# Patient Record
Sex: MALE | Race: ASIAN | NOT HISPANIC OR LATINO | URBAN - METROPOLITAN AREA
[De-identification: names, ages, dates, MRNs, and addresses within clinical notes are randomized per-mention and may not be internally consistent; named-entity substitution may affect disease eponyms.]

---

## 2016-01-21 RX ORDER — ATORVASTATIN CALCIUM 80 MG/1
1 TABLET, FILM COATED ORAL
Qty: 0 | Refills: 0 | COMMUNITY
Start: 2016-01-21

## 2017-01-18 ENCOUNTER — OUTPATIENT (OUTPATIENT)
Dept: OUTPATIENT SERVICES | Facility: HOSPITAL | Age: 57
LOS: 1 days | End: 2017-01-18
Payer: MEDICAID

## 2017-01-18 ENCOUNTER — APPOINTMENT (OUTPATIENT)
Dept: MRI IMAGING | Facility: CLINIC | Age: 57
End: 2017-01-18

## 2017-01-18 DIAGNOSIS — R51 HEADACHE: ICD-10-CM

## 2017-01-18 PROCEDURE — 70551 MRI BRAIN STEM W/O DYE: CPT

## 2017-01-20 ENCOUNTER — APPOINTMENT (OUTPATIENT)
Dept: NEUROSURGERY | Facility: CLINIC | Age: 57
End: 2017-01-20

## 2017-01-20 VITALS
DIASTOLIC BLOOD PRESSURE: 78 MMHG | SYSTOLIC BLOOD PRESSURE: 122 MMHG | OXYGEN SATURATION: 98 % | HEART RATE: 79 BPM | RESPIRATION RATE: 16 BRPM | BODY MASS INDEX: 28.52 KG/M2 | HEIGHT: 62 IN | WEIGHT: 155 LBS | TEMPERATURE: 98.1 F

## 2017-03-03 ENCOUNTER — APPOINTMENT (OUTPATIENT)
Dept: CT IMAGING | Facility: IMAGING CENTER | Age: 57
End: 2017-03-03

## 2017-03-03 ENCOUNTER — OUTPATIENT (OUTPATIENT)
Dept: OUTPATIENT SERVICES | Facility: HOSPITAL | Age: 57
LOS: 1 days | End: 2017-03-03
Payer: MEDICAID

## 2017-03-03 DIAGNOSIS — I62.03 NONTRAUMATIC CHRONIC SUBDURAL HEMORRHAGE: ICD-10-CM

## 2017-03-03 PROCEDURE — 82565 ASSAY OF CREATININE: CPT

## 2017-03-03 PROCEDURE — 70496 CT ANGIOGRAPHY HEAD: CPT

## 2017-03-10 ENCOUNTER — APPOINTMENT (OUTPATIENT)
Dept: NEUROSURGERY | Facility: CLINIC | Age: 57
End: 2017-03-10

## 2017-03-16 ENCOUNTER — NON-APPOINTMENT (OUTPATIENT)
Age: 57
End: 2017-03-16

## 2017-03-16 ENCOUNTER — APPOINTMENT (OUTPATIENT)
Dept: CARDIOLOGY | Facility: CLINIC | Age: 57
End: 2017-03-16

## 2017-03-16 VITALS
WEIGHT: 160 LBS | BODY MASS INDEX: 29.44 KG/M2 | HEIGHT: 62 IN | SYSTOLIC BLOOD PRESSURE: 123 MMHG | HEART RATE: 81 BPM | DIASTOLIC BLOOD PRESSURE: 78 MMHG

## 2017-03-29 ENCOUNTER — APPOINTMENT (OUTPATIENT)
Dept: OTOLARYNGOLOGY | Facility: CLINIC | Age: 57
End: 2017-03-29

## 2017-03-29 VITALS
HEIGHT: 62 IN | DIASTOLIC BLOOD PRESSURE: 67 MMHG | WEIGHT: 160 LBS | HEART RATE: 70 BPM | SYSTOLIC BLOOD PRESSURE: 128 MMHG | BODY MASS INDEX: 29.44 KG/M2

## 2017-06-01 ENCOUNTER — OUTPATIENT (OUTPATIENT)
Dept: OUTPATIENT SERVICES | Facility: HOSPITAL | Age: 57
LOS: 1 days | End: 2017-06-01
Payer: MEDICAID

## 2017-06-13 ENCOUNTER — OUTPATIENT (OUTPATIENT)
Dept: OUTPATIENT SERVICES | Facility: HOSPITAL | Age: 57
LOS: 1 days | End: 2017-06-13
Payer: MEDICAID

## 2017-06-13 ENCOUNTER — APPOINTMENT (OUTPATIENT)
Dept: MRI IMAGING | Facility: IMAGING CENTER | Age: 57
End: 2017-06-13

## 2017-06-13 DIAGNOSIS — I62.03 NONTRAUMATIC CHRONIC SUBDURAL HEMORRHAGE: ICD-10-CM

## 2017-06-13 PROCEDURE — 70551 MRI BRAIN STEM W/O DYE: CPT

## 2017-06-16 ENCOUNTER — APPOINTMENT (OUTPATIENT)
Dept: NEUROSURGERY | Facility: CLINIC | Age: 57
End: 2017-06-16

## 2017-06-16 VITALS
HEIGHT: 62 IN | HEART RATE: 70 BPM | RESPIRATION RATE: 14 BRPM | WEIGHT: 160 LBS | DIASTOLIC BLOOD PRESSURE: 81 MMHG | OXYGEN SATURATION: 98 % | SYSTOLIC BLOOD PRESSURE: 118 MMHG | BODY MASS INDEX: 29.44 KG/M2

## 2017-06-16 DIAGNOSIS — I62.01 NONTRAUMATIC ACUTE SUBDURAL HEMORRHAGE: ICD-10-CM

## 2017-06-16 RX ORDER — ACETAMINOPHEN 325 MG/1
325 TABLET, FILM COATED ORAL
Refills: 0 | Status: ACTIVE | COMMUNITY

## 2017-06-19 LAB
BUN SERPL-MCNC: 19 MG/DL
CREAT SERPL-MCNC: 1.14 MG/DL

## 2017-06-27 ENCOUNTER — INPATIENT (INPATIENT)
Facility: HOSPITAL | Age: 57
LOS: 2 days | Discharge: HOME | End: 2017-06-30
Attending: INTERNAL MEDICINE | Admitting: INTERNAL MEDICINE
Payer: MEDICAID

## 2017-06-27 VITALS
TEMPERATURE: 98 F | SYSTOLIC BLOOD PRESSURE: 122 MMHG | HEART RATE: 99 BPM | RESPIRATION RATE: 16 BRPM | OXYGEN SATURATION: 100 % | DIASTOLIC BLOOD PRESSURE: 78 MMHG

## 2017-06-27 DIAGNOSIS — Z29.9 ENCOUNTER FOR PROPHYLACTIC MEASURES, UNSPECIFIED: ICD-10-CM

## 2017-06-27 DIAGNOSIS — I10 ESSENTIAL (PRIMARY) HYPERTENSION: ICD-10-CM

## 2017-06-27 DIAGNOSIS — I61.9 NONTRAUMATIC INTRACEREBRAL HEMORRHAGE, UNSPECIFIED: Chronic | ICD-10-CM

## 2017-06-27 DIAGNOSIS — R07.9 CHEST PAIN, UNSPECIFIED: ICD-10-CM

## 2017-06-27 DIAGNOSIS — I20.9 ANGINA PECTORIS, UNSPECIFIED: ICD-10-CM

## 2017-06-27 DIAGNOSIS — Z95.5 PRESENCE OF CORONARY ANGIOPLASTY IMPLANT AND GRAFT: Chronic | ICD-10-CM

## 2017-06-27 DIAGNOSIS — E78.00 PURE HYPERCHOLESTEROLEMIA, UNSPECIFIED: ICD-10-CM

## 2017-06-27 DIAGNOSIS — E11.9 TYPE 2 DIABETES MELLITUS WITHOUT COMPLICATIONS: ICD-10-CM

## 2017-06-27 LAB
ALBUMIN SERPL ELPH-MCNC: 4.5 G/DL — SIGNIFICANT CHANGE UP (ref 3.3–5)
ALP SERPL-CCNC: 92 U/L — SIGNIFICANT CHANGE UP (ref 40–120)
ALT FLD-CCNC: 29 U/L — SIGNIFICANT CHANGE UP (ref 4–41)
APTT BLD: 37.9 SEC — HIGH (ref 27.5–37.4)
AST SERPL-CCNC: 32 U/L — SIGNIFICANT CHANGE UP (ref 4–40)
BASOPHILS # BLD AUTO: 0.06 K/UL — SIGNIFICANT CHANGE UP (ref 0–0.2)
BASOPHILS NFR BLD AUTO: 1.1 % — SIGNIFICANT CHANGE UP (ref 0–2)
BILIRUB SERPL-MCNC: 0.6 MG/DL — SIGNIFICANT CHANGE UP (ref 0.2–1.2)
BUN SERPL-MCNC: 16 MG/DL — SIGNIFICANT CHANGE UP (ref 7–23)
CALCIUM SERPL-MCNC: 9.7 MG/DL — SIGNIFICANT CHANGE UP (ref 8.4–10.5)
CHLORIDE SERPL-SCNC: 99 MMOL/L — SIGNIFICANT CHANGE UP (ref 98–107)
CK MB BLD-MCNC: 1 — SIGNIFICANT CHANGE UP (ref 0–2.5)
CK MB BLD-MCNC: 2.68 NG/ML — SIGNIFICANT CHANGE UP (ref 1–6.6)
CK MB BLD-MCNC: 2.83 NG/ML — SIGNIFICANT CHANGE UP (ref 1–6.6)
CK SERPL-CCNC: 262 U/L — HIGH (ref 30–200)
CK SERPL-CCNC: 289 U/L — HIGH (ref 30–200)
CO2 SERPL-SCNC: 26 MMOL/L — SIGNIFICANT CHANGE UP (ref 22–31)
CREAT SERPL-MCNC: 1.03 MG/DL — SIGNIFICANT CHANGE UP (ref 0.5–1.3)
EOSINOPHIL # BLD AUTO: 0.34 K/UL — SIGNIFICANT CHANGE UP (ref 0–0.5)
EOSINOPHIL NFR BLD AUTO: 6 % — SIGNIFICANT CHANGE UP (ref 0–6)
GLUCOSE SERPL-MCNC: 137 MG/DL — HIGH (ref 70–99)
HBA1C BLD-MCNC: 7.7 % — HIGH (ref 4–5.6)
HCT VFR BLD CALC: 38.5 % — LOW (ref 39–50)
HGB BLD-MCNC: 12.4 G/DL — LOW (ref 13–17)
IMM GRANULOCYTES NFR BLD AUTO: 0.4 % — SIGNIFICANT CHANGE UP (ref 0–1.5)
INR BLD: 0.95 — SIGNIFICANT CHANGE UP (ref 0.88–1.17)
LYMPHOCYTES # BLD AUTO: 2.12 K/UL — SIGNIFICANT CHANGE UP (ref 1–3.3)
LYMPHOCYTES # BLD AUTO: 37.6 % — SIGNIFICANT CHANGE UP (ref 13–44)
MCHC RBC-ENTMCNC: 26.7 PG — LOW (ref 27–34)
MCHC RBC-ENTMCNC: 32.2 % — SIGNIFICANT CHANGE UP (ref 32–36)
MCV RBC AUTO: 82.8 FL — SIGNIFICANT CHANGE UP (ref 80–100)
MONOCYTES # BLD AUTO: 0.44 K/UL — SIGNIFICANT CHANGE UP (ref 0–0.9)
MONOCYTES NFR BLD AUTO: 7.8 % — SIGNIFICANT CHANGE UP (ref 2–14)
NEUTROPHILS # BLD AUTO: 2.66 K/UL — SIGNIFICANT CHANGE UP (ref 1.8–7.4)
NEUTROPHILS NFR BLD AUTO: 47.1 % — SIGNIFICANT CHANGE UP (ref 43–77)
PLATELET # BLD AUTO: 267 K/UL — SIGNIFICANT CHANGE UP (ref 150–400)
PMV BLD: 10.4 FL — SIGNIFICANT CHANGE UP (ref 7–13)
POTASSIUM SERPL-MCNC: 4 MMOL/L — SIGNIFICANT CHANGE UP (ref 3.5–5.3)
POTASSIUM SERPL-SCNC: 4 MMOL/L — SIGNIFICANT CHANGE UP (ref 3.5–5.3)
PROT SERPL-MCNC: 7.6 G/DL — SIGNIFICANT CHANGE UP (ref 6–8.3)
PROTHROM AB SERPL-ACNC: 10.6 SEC — SIGNIFICANT CHANGE UP (ref 9.8–13.1)
RBC # BLD: 4.65 M/UL — SIGNIFICANT CHANGE UP (ref 4.2–5.8)
RBC # FLD: 13.5 % — SIGNIFICANT CHANGE UP (ref 10.3–14.5)
SODIUM SERPL-SCNC: 140 MMOL/L — SIGNIFICANT CHANGE UP (ref 135–145)
TROPONIN T SERPL-MCNC: < 0.06 NG/ML — SIGNIFICANT CHANGE UP (ref 0–0.06)
TROPONIN T SERPL-MCNC: < 0.06 NG/ML — SIGNIFICANT CHANGE UP (ref 0–0.06)
WBC # BLD: 5.64 K/UL — SIGNIFICANT CHANGE UP (ref 3.8–10.5)
WBC # FLD AUTO: 5.64 K/UL — SIGNIFICANT CHANGE UP (ref 3.8–10.5)

## 2017-06-27 PROCEDURE — 71020: CPT | Mod: 26

## 2017-06-27 RX ORDER — ATORVASTATIN CALCIUM 80 MG/1
40 TABLET, FILM COATED ORAL AT BEDTIME
Qty: 0 | Refills: 0 | Status: DISCONTINUED | OUTPATIENT
Start: 2017-06-27 | End: 2017-06-30

## 2017-06-27 RX ORDER — DEXTROSE 50 % IN WATER 50 %
12.5 SYRINGE (ML) INTRAVENOUS ONCE
Qty: 0 | Refills: 0 | Status: DISCONTINUED | OUTPATIENT
Start: 2017-06-27 | End: 2017-06-30

## 2017-06-27 RX ORDER — DEXTROSE 50 % IN WATER 50 %
1 SYRINGE (ML) INTRAVENOUS ONCE
Qty: 0 | Refills: 0 | Status: DISCONTINUED | OUTPATIENT
Start: 2017-06-27 | End: 2017-06-30

## 2017-06-27 RX ORDER — INSULIN LISPRO 100/ML
VIAL (ML) SUBCUTANEOUS
Qty: 0 | Refills: 0 | Status: DISCONTINUED | OUTPATIENT
Start: 2017-06-27 | End: 2017-06-30

## 2017-06-27 RX ORDER — DEXTROSE 50 % IN WATER 50 %
25 SYRINGE (ML) INTRAVENOUS ONCE
Qty: 0 | Refills: 0 | Status: DISCONTINUED | OUTPATIENT
Start: 2017-06-27 | End: 2017-06-30

## 2017-06-27 RX ORDER — ENOXAPARIN SODIUM 100 MG/ML
40 INJECTION SUBCUTANEOUS EVERY 24 HOURS
Qty: 0 | Refills: 0 | Status: DISCONTINUED | OUTPATIENT
Start: 2017-06-27 | End: 2017-06-27

## 2017-06-27 RX ORDER — INSULIN LISPRO 100/ML
VIAL (ML) SUBCUTANEOUS AT BEDTIME
Qty: 0 | Refills: 0 | Status: DISCONTINUED | OUTPATIENT
Start: 2017-06-27 | End: 2017-06-30

## 2017-06-27 RX ORDER — ASPIRIN/CALCIUM CARB/MAGNESIUM 324 MG
81 TABLET ORAL ONCE
Qty: 0 | Refills: 0 | Status: COMPLETED | OUTPATIENT
Start: 2017-06-27 | End: 2017-06-27

## 2017-06-27 RX ORDER — GLUCAGON INJECTION, SOLUTION 0.5 MG/.1ML
1 INJECTION, SOLUTION SUBCUTANEOUS ONCE
Qty: 0 | Refills: 0 | Status: DISCONTINUED | OUTPATIENT
Start: 2017-06-27 | End: 2017-06-30

## 2017-06-27 RX ADMIN — ATORVASTATIN CALCIUM 40 MILLIGRAM(S): 80 TABLET, FILM COATED ORAL at 23:16

## 2017-06-27 RX ADMIN — Medication 81 MILLIGRAM(S): at 13:46

## 2017-06-27 NOTE — CONSULT NOTE ADULT - SUBJECTIVE AND OBJECTIVE BOX
CHIEF COMPLAINT: chest pain    HISTORY OF PRESENT ILLNESS:      Allergies    fish, meat and eggs (Vomiting)  No Known Drug Allergies    Intolerances    	    MEDICATIONS:                  PAST MEDICAL & SURGICAL HISTORY:  CAD (coronary artery disease)  Myocardial infarction: 2005  Hypercholesteremia  Hypertension  Diabetes mellitus: diet control  Acute intracerebral hemorrhage  History of coronary angiogram: stents - 4/2005 had 2 stents placed @ [facility] &amp; then 6/2005 had 3 stents placed at [facility]      FAMILY HISTORY:  No pertinent family history in first degree relatives      SOCIAL HISTORY:    [ ] Non-smoker  [ ] Smoker  [ ] Alcohol      REVIEW OF SYSTEMS:  See HPI. Otherwise, 10 point ROS done and otherwise negative.    PHYSICAL EXAM:  T(C): 36.7 (06-27-17 @ 12:37), Max: 36.7 (06-27-17 @ 12:37)  HR: 73 (06-27-17 @ 13:24) (73 - 99)  BP: 130/70 (06-27-17 @ 13:24) (122/78 - 130/70)  RR: 16 (06-27-17 @ 13:24) (16 - 16)  SpO2: 100% (06-27-17 @ 13:24) (100% - 100%)  Wt(kg): --  I&O's Summary      Appearance: Normal	  HEENT:   Normal oral mucosa, PERRL, EOMI	  Lymphatic: No lymphadenopathy  Cardiovascular: Normal S1 S2, No JVD, No murmurs, No edema  Respiratory: Lungs clear to auscultation	  Psychiatry: A & O x 3, Mood & affect appropriate  Gastrointestinal:  Soft, Non-tender, + BS	  Skin: No rashes, No ecchymoses, No cyanosis	  Neurologic: Non-focal  Extremities: Normal range of motion, No clubbing, cyanosis or edema  Vascular: Peripheral pulses palpable 2+ bilaterally    Cath 9/9/2013 oLAD 30%, pLAD 50%, mLAD 90% ISR, mcx 10% ISR, patent dcx stent, pOM3 40$, pRCA 20%, mRCA 20%, LVEF 65%      LABS:	 	              proBNP:   Lipid Profile:   HgA1c:   TSH:       CARDIAC MARKERS:            TELEMETRY: 	    ECG:  	  RADIOLOGY:  OTHER: 	    PREVIOUS DIAGNOSTIC TESTING:    [ ] Echocardiogram:  [ ]  Catheterization:  [ ] Stress Test:  	  	  ASSESSMENT/PLAN:

## 2017-06-27 NOTE — ED ADULT TRIAGE NOTE - CHIEF COMPLAINT QUOTE
Pt c/o chest pain radiating to left arm x 2 weeks progressively getting worse and more constant with SOB. hx 8 stents

## 2017-06-27 NOTE — ED PROVIDER NOTE - OBJECTIVE STATEMENT
Site/Location - left sided chest pain  Intensity / Severity - 7/10  Quality / Character - pressure/tightness  Onset / Duration - 2 days since yesterday morning continuous waxing waning  Radiation - to left shoulder  Context - has had similar pain over the past few weeks, last stress test Jan 2016, last stents 2013  Aggravating factors - exertion, but at baseline exercise tolerance  Alleviating factors - none  Associated Signs and Symptoms - no fever, no chills, +SOB, +diaphoresis, no vomiting, no LE edema, no cough Site/Location - left sided chest pain  Intensity / Severity - 7/10 no pain now  Quality / Character - pressure/tightness  Onset / Duration - 2 days since yesterday morning continuous waxing waning  Radiation - to left shoulder  Context - has had similar pain over the past few weeks, last stress test Jan 2016, last stents 2013; took 81mg asa today  Aggravating factors - exertion, but at baseline exercise tolerance  Alleviating factors - none  Associated Signs and Symptoms - no fever, no chills, +SOB, +diaphoresis, no vomiting, no LE edema, no cough

## 2017-06-27 NOTE — ED PROVIDER NOTE - PSH
Acute intracerebral hemorrhage    History of coronary angiogram  stents - 4/2005 had 2 stents placed @ [facility] & then 6/2005 had 3 stents placed at [facility]

## 2017-06-27 NOTE — ED PROVIDER NOTE - MEDICAL DECISION MAKING DETAILS
Chest pain concerning for ACS and CAD risk factors  1) serial EKGs/CXR/ASA/O2/cardiac monitor/LABS/nitro prn CP   2) reassess  3) Admit to telemetry

## 2017-06-27 NOTE — CONSULT NOTE ADULT - SUBJECTIVE AND OBJECTIVE BOX
Date of Admission: 6/27/17    CHIEF COMPLAINT: chest pain.    HISTORY OF PRESENT ILLNESS:    The patient is a 56 year old male with a history of hypertension and CAD with 8 stents and an echo on 2/16/17 showing LVEF 50%, presenting with chest pain since yesterday morning. The pain is constant, left sided, radiates to the left shoulder, jaw, and legs, and feels like a pressure with stabbing pain. He has had exertional angina for the past few weeks but the pain since yesterday morning has not subsided with rest.    LM:   --  LM: Normal. Short.  LAD:   --  Ostial LAD: There was a discrete 30 % stenosis at the site of a  prior stent.  --  Proximal LAD: There was a discrete 50 % stenosis at the origin of S1.  There was BEATRICE grade 3 flow through the vessel (brisk flow).  --  Mid LAD: There was a tubular 90 % stenosis at the site of a prior  stent, in the proximal third of the vessel segment. There was BEATRICE grade 3  flow through the vessel (brisk flow).  CX:   --  Proximal circumflex: Angiography showed minor luminal  irregularities with no flow limiting lesions.  --  Mid circumflex: There was a discrete 10 % stenosis at the site of a  prior stent.  --  Distal circumflex: There was a 0 % stenosis at the site of a prior  stent.  --  OM3: The vessel was medium sized. There was a tubular 40 % stenosis in  the proximal third of the vessel segment.  RCA:   --  RCA: Angiography showed minor luminal irregularities with no  flow limiting lesions.  --  Proximal RCA: There was a tubular 20 % stenosis.  --  Mid RCA: There was a discrete 20 % stenosis.        Allergies    fish, meat and eggs (Vomiting)  No Known Drug Allergies    Intolerances    	    MEDICATIONS:                  PAST MEDICAL & SURGICAL HISTORY:  CAD (coronary artery disease)  Myocardial infarction: 2005  Hypercholesteremia  Hypertension  Diabetes mellitus: diet control  Acute intracerebral hemorrhage  History of coronary angiogram: stents - 4/2005 had 2 stents placed @ [facility] &amp; then 6/2005 had 3 stents placed at [facility]      FAMILY HISTORY:  No pertinent family history in first degree relatives      SOCIAL HISTORY:    [ ] Non-smoker  [ ] Smoker  [ ] Alcohol      REVIEW OF SYSTEMS:  See HPI. Otherwise, 10 point ROS done and otherwise negative.    PHYSICAL EXAM:  T(C): 36.7 (06-27-17 @ 12:37), Max: 36.7 (06-27-17 @ 12:37)  HR: 73 (06-27-17 @ 13:24) (73 - 99)  BP: 130/70 (06-27-17 @ 13:24) (122/78 - 130/70)  RR: 16 (06-27-17 @ 13:24) (16 - 16)  SpO2: 100% (06-27-17 @ 13:24) (100% - 100%)  Wt(kg): --  I&O's Summary      Appearance: Normal	  HEENT:   Normal oral mucosa, PERRL, EOMI	  Lymphatic: No lymphadenopathy  Cardiovascular: Normal S1 S2, No JVD, No murmurs, No edema  Respiratory: Lungs clear to auscultation	  Psychiatry: A & O x 3, Mood & affect appropriate  Gastrointestinal:  Soft, Non-tender, + BS	  Skin: No rashes, No ecchymoses, No cyanosis	  Neurologic: Non-focal  Extremities: Normal range of motion, No clubbing, cyanosis or edema  Vascular: Peripheral pulses palpable 2+ bilaterally        LABS:	 	    CBC Full  -  ( 27 Jun 2017 12:08 )  WBC Count : 5.64 K/uL  Hemoglobin : 12.4 g/dL  Hematocrit : 38.5 %  Platelet Count - Automated : 267 K/uL  Mean Cell Volume : 82.8 fL  Mean Cell Hemoglobin : 26.7 pg  Mean Cell Hemoglobin Concentration : 32.2 %  Auto Neutrophil # : 2.66 K/uL  Auto Lymphocyte # : 2.12 K/uL  Auto Monocyte # : 0.44 K/uL  Auto Eosinophil # : 0.34 K/uL  Auto Basophil # : 0.06 K/uL  Auto Neutrophil % : 47.1 %  Auto Lymphocyte % : 37.6 %  Auto Monocyte % : 7.8 %  Auto Eosinophil % : 6.0 %  Auto Basophil % : 1.1 %    06-27    140  |  99  |  16  ----------------------------<  137<H>  4.0   |  26  |  1.03    Ca    9.7      27 Jun 2017 12:08    TPro  7.6  /  Alb  4.5  /  TBili  0.6  /  DBili  x   /  AST  32  /  ALT  29  /  AlkPhos  92  06-27      proBNP:   Lipid Profile:   HgA1c:   TSH:       CARDIAC MARKERS:      CKMB: 2.83 ng/mL (06-27 @ 12:08)        TELEMETRY: 	    ECG:  	  RADIOLOGY:  OTHER: 	    PREVIOUS DIAGNOSTIC TESTING:    [ ] Echocardiogram:  [ ]  Catheterization:  [ ] Stress Test:  	  	  ASSESSMENT/PLAN: Date of Admission: 6/27/17    CHIEF COMPLAINT: chest pain.    HISTORY OF PRESENT ILLNESS:    The patient is a 56 year old male with a history of hypertension and CAD with 8 stents and an echo on 2/16/17 showing LVEF 50%, presenting with chest pain since yesterday morning. The pain is constant, left sided, radiates to the left shoulder, jaw, and legs, and feels like a pressure with stabbing pain. He has had exertional angina for the past few weeks but the pain since yesterday morning has not subsided with rest. No nausea, vomiting, diaphoresis, SOB, or fevers. The patient's pain started to improve on his way to the hospital today. He has been taking only his normal daily medications which includes aspirin 81 mg daily.  9/9/13, oLAD 30%, pLAD 50%, mLAD 90% ISR, mCx 10% ISR, patent dCx stent, pOM3 40%, pRCA 20%, mRCA 20%, LVEF 65% SyborFyqdwkv5Aew CckpuTeolgld63Cfqts   Stent: 9/9/13, XPEDITION stents to pLAD 50% and mLAD 90% ISR UpjuoFjlcdvm66Wom LwdhtOuwqgwn91Gpaym GzvceNpycbuz78Vuj JopuuTzohtkl81Lzjac UeckdYwswimb84Omr WebqzXixlpkz22Bnouc DbjfbUagottx60Rkj HeknsYiqdoiy79Tovdl LpczlGjvydmd47Atr AskaxMbcnckm83Cllfq YanenWoceuln48Tqv XfnduYxbvcpz43Xkfqm LxcqvCtpprdy35Dsn ZcsfeRrmdmhu83Hrged JpnavPrjjqbs81Nsr OyjeoNmosqoz38Kcehh QcjldMgwsowg57Uav LtrjhJmfzgbp77Diobs JgyttDxuzjzt90Afj KqmlqNjkvjlt62Fhwad ZgcscImajdng79Coy TxfcuhiekaLiytjhrx039i7zp-vw86-774x-c744-h7c770n70951EtojKyz           LM:   --  LM: Normal. Short.  LAD:   --  Ostial LAD: There was a discrete 30 % stenosis at the site of a  prior stent.  --  Proximal LAD: There was a discrete 50 % stenosis at the origin of S1.  There was BEATRICE grade 3 flow through the vessel (brisk flow).  --  Mid LAD: There was a tubular 90 % stenosis at the site of a prior  stent, in the proximal third of the vessel segment. There was BEATRICE grade 3  flow through the vessel (brisk flow).  CX:   --  Proximal circumflex: Angiography showed minor luminal  irregularities with no flow limiting lesions.  --  Mid circumflex: There was a discrete 10 % stenosis at the site of a  prior stent.  --  Distal circumflex: There was a 0 % stenosis at the site of a prior  stent.  --  OM3: The vessel was medium sized. There was a tubular 40 % stenosis in  the proximal third of the vessel segment.  RCA:   --  RCA: Angiography showed minor luminal irregularities with no  flow limiting lesions.  --  Proximal RCA: There was a tubular 20 % stenosis.  --  Mid RCA: There was a discrete 20 % stenosis.        Allergies    fish, meat and eggs (Vomiting)  No Known Drug Allergies    Intolerances    	    MEDICATIONS:                  PAST MEDICAL & SURGICAL HISTORY:  CAD (coronary artery disease)  Myocardial infarction: 2005  Hypercholesteremia  Hypertension  Diabetes mellitus: diet control  Acute intracerebral hemorrhage  History of coronary angiogram: stents - 4/2005 had 2 stents placed @ [facility] &amp; then 6/2005 had 3 stents placed at [facility]      FAMILY HISTORY:  No pertinent family history in first degree relatives      SOCIAL HISTORY:    [ ] Non-smoker  [ ] Smoker  [ ] Alcohol      REVIEW OF SYSTEMS:  See HPI. Otherwise, 10 point ROS done and otherwise negative.    PHYSICAL EXAM:  T(C): 36.7 (06-27-17 @ 12:37), Max: 36.7 (06-27-17 @ 12:37)  HR: 73 (06-27-17 @ 13:24) (73 - 99)  BP: 130/70 (06-27-17 @ 13:24) (122/78 - 130/70)  RR: 16 (06-27-17 @ 13:24) (16 - 16)  SpO2: 100% (06-27-17 @ 13:24) (100% - 100%)  Wt(kg): --  I&O's Summary      Appearance: Normal	  HEENT:   Normal oral mucosa, PERRL, EOMI	  Lymphatic: No lymphadenopathy  Cardiovascular: Normal S1 S2, No JVD, No murmurs, No edema  Respiratory: Lungs clear to auscultation	  Psychiatry: A & O x 3, Mood & affect appropriate  Gastrointestinal:  Soft, Non-tender, + BS	  Skin: No rashes, No ecchymoses, No cyanosis	  Neurologic: Non-focal  Extremities: Normal range of motion, No clubbing, cyanosis or edema  Vascular: Peripheral pulses palpable 2+ bilaterally        LABS:	 	    CBC Full  -  ( 27 Jun 2017 12:08 )  WBC Count : 5.64 K/uL  Hemoglobin : 12.4 g/dL  Hematocrit : 38.5 %  Platelet Count - Automated : 267 K/uL  Mean Cell Volume : 82.8 fL  Mean Cell Hemoglobin : 26.7 pg  Mean Cell Hemoglobin Concentration : 32.2 %  Auto Neutrophil # : 2.66 K/uL  Auto Lymphocyte # : 2.12 K/uL  Auto Monocyte # : 0.44 K/uL  Auto Eosinophil # : 0.34 K/uL  Auto Basophil # : 0.06 K/uL  Auto Neutrophil % : 47.1 %  Auto Lymphocyte % : 37.6 %  Auto Monocyte % : 7.8 %  Auto Eosinophil % : 6.0 %  Auto Basophil % : 1.1 %    06-27    140  |  99  |  16  ----------------------------<  137<H>  4.0   |  26  |  1.03    Ca    9.7      27 Jun 2017 12:08    TPro  7.6  /  Alb  4.5  /  TBili  0.6  /  DBili  x   /  AST  32  /  ALT  29  /  AlkPhos  92  06-27      proBNP:   Lipid Profile:   HgA1c:   TSH:       CARDIAC MARKERS:      CKMB: 2.83 ng/mL (06-27 @ 12:08)        TELEMETRY: 	    ECG:  	  RADIOLOGY:  OTHER: 	    PREVIOUS DIAGNOSTIC TESTING:    [ ] Echocardiogram:  [ ]  Catheterization:  [ ] Stress Test:  	  	  ASSESSMENT/PLAN: Date of Admission: 6/27/17    CHIEF COMPLAINT: chest pain.    HISTORY OF PRESENT ILLNESS:    The patient is a 56 year old male with a history of CAD with stents in LAD x3, circumflex x2, OM3 x1, and RCA x2, an echo on 2/16/17 showing LVEF 50%, subdural hematoma S/P craniotomy in 2015, diabetes, hypertension, and hyperlipidemia, presenting with chest pain since yesterday morning. The pain is constant, left sided, radiates to the left shoulder, jaw, and legs, and feels like a pressure with stabbing pain. He has had exertional angina for the past few weeks but the pain since yesterday morning has not subsided with rest. No nausea, vomiting, diaphoresis, SOB, or fevers. The patient's pain started to improve on his way to the hospital today. He has been taking only his normal daily medications which includes aspirin 81 mg daily.     In 2015, the patient was on dual anticoagulant therapy for the stents (plavix and aspirin) and he developed a subdural hematoma, which was treated by craniotomy and has been followed up with many MRI’s. BpswuNresnie08Nxc DeaylUcmhyiu52Uqviz XizhvMbscvol76Rrt UxqidEfnxurc10Sebhv RvwhyKjununt80Xaw OjtueVzpbafb42Aipov UaffcDcpndxo96Ahq LylphXkpybkz72Czjdi QtpoyKrnvamj69Lkp LutzkTkwrdue10Mipxr HcqhrAaerdiq26Qod VdfzqUbszste78Nsxfs VzwwiFxkhoxo60Swq NjyqgXnteovz94Qltmu DxwteFlgmkgf01Zid LzlbjNpfcwxq81Jmbvu HhpwgLwstulo17Tjl IewcnZxdkljn55Rvlsn PvlsuNvchqzq22Qiv IqtxvZcfttkv87Bpqoh QwgdkFvjhcwi46Tyx JdtmrtnduyTulvlxsr905g4ua-kn69-297z-s387-o0z874f32924CnklHld  Since then, his only anticoagulant therapy has been aspirin 81 mg daily.    Cardiac cath:  9/9/13, oLAD 30%, pLAD 50%, mLAD 90% ISR, mCx 10% ISR, patent dCx stent, pOM3 40%, pRCA 20%, mRCA 20%, LVEF 65% GamtaZwhcvhd8Mzx HjjsbYaiynfn24Egnuw   Stent: 9/9/13, XPEDITION stents to pLAD 50% and mLAD 90% ISR PwbjmQkcfhad77Dmw TxutrIrkesxn20Szxmn EgaqjIflfuwi48Jpz FdkroWnmabel45Hlwma FscamHymutvn39Ntl LlfryIduowjq95Benub WrtmuOujwnbr30Dtd FufbrZuxtmwa18Vcdbx FpkxnGmqugch70Nmk GehwvEczonor33Wmkzw XwmznFoopmvu38Rms YdmpgEuhixoh58Hmhkb FuqnbZbtlrpv13Zkt JlgdzCdvwcaq07Jdbln OnpipZivengc16Qiv RpdelQfcusvz28Kwjwd TmxwhPrrwajj21Dvi RpsjtQbjedko06Bghuo TviwlAdruhga05Nrj AtcmgYfawbli17Rqtna TyhsiOhcwqdg09Xsz XqaxpxobpsGovvcqbd212c0sd-bf44-691t-u694-o5y924u54159RowhWxz         Allergies: No Known Drug Allergies    MEDICATIONS:    CurrentMeds_4_twCiteListControlStart Aspirin 81 MG TABS; TAKE 1 TABLET DAILY  Atorvastatin Calcium 40 MG Oral Tablet; TAKE 1 TABLET DAILY AS DIRECTED  Enalapril Maleate 10 MG Oral Tablet; TAKE 1 TABLET DAILY  Gemfibrozil 600 MG Oral Tablet; TAKE 1 TABLET DAILY  HydroCHLOROthiazide 25 MG Oral Tablet; TAKE 1 TABLET DAILY AS DIRECTED  MetFORMIN HCl - 850 MG Oral Tablet; Take 1 tablet daily  Metoprolol Succinate ER 25 MG Oral Tablet Extended Release 24 Hour; TAKE 1 TABLET  ONCE DAILY    PAST MEDICAL & SURGICAL HISTORY:  CAD (coronary artery disease)  Myocardial infarction: 2005  Hypercholesteremia  Hypertension  Diabetes mellitus: diet control  Subdural hematoma and craniotomy in 2015  History of coronary angiogram: stents - 4/2005 had 2 stents placed @ [facility] &amp; then 6/2005 had 3 stents placed at [facility]      FAMILY HISTORY:  Patient is unsure of pertinent family history in first degree relatives      SOCIAL HISTORY:    The patient is not working due to the head pain from the subdural hemorrhage and surgery in 2015.  Non-smoker, no illicit drugs, no alcohol          REVIEW OF SYSTEMS:  See HPI. Otherwise, 10 point ROS done and otherwise negative.    PHYSICAL EXAM:  T(C): 36.7 (06-27-17 @ 12:37), Max: 36.7 (06-27-17 @ 12:37)  HR: 73 (06-27-17 @ 13:24) (73 - 99)  BP: 130/70 (06-27-17 @ 13:24) (122/78 - 130/70)  RR: 16 (06-27-17 @ 13:24) (16 - 16)  SpO2: 100% (06-27-17 @ 13:24) (100% - 100%)      Appearance: No apparent distress	  HEENT:   Normal oral mucosa, PERRL, EOMI	  Lymphatic: No lymphadenopathy  Cardiovascular: Normal S1 S2, No JVD, No murmurs, No edema  Respiratory: Lungs clear to auscultation	  Psychiatry: A & O x 3, Mood & affect appropriate  Gastrointestinal:  Soft, Non-tender, + BS	  Skin: No rashes, No ecchymoses, No cyanosis	  Neurologic: Non-focal  Extremities: Normal range of motion, No clubbing, cyanosis or edema  Vascular: Peripheral pulses palpable 2+ bilaterally        LABS:	 	    CBC Full  -  ( 27 Jun 2017 12:08 )  WBC Count : 5.64 K/uL  Hemoglobin : 12.4 g/dL  Hematocrit : 38.5 %  Platelet Count - Automated : 267 K/uL  Mean Cell Volume : 82.8 fL  Mean Cell Hemoglobin : 26.7 pg  Mean Cell Hemoglobin Concentration : 32.2 %  Auto Neutrophil # : 2.66 K/uL  Auto Lymphocyte # : 2.12 K/uL  Auto Monocyte # : 0.44 K/uL  Auto Eosinophil # : 0.34 K/uL  Auto Basophil # : 0.06 K/uL  Auto Neutrophil % : 47.1 %  Auto Lymphocyte % : 37.6 %  Auto Monocyte % : 7.8 %  Auto Eosinophil % : 6.0 %  Auto Basophil % : 1.1 %    06-27    140  |  99  |  16  ----------------------------<  137<H>  4.0   |  26  |  1.03    Ca    9.7      27 Jun 2017 12:08    TPro  7.6  /  Alb  4.5  /  TBili  0.6  /  DBili  x   /  AST  32  /  ALT  29  /  AlkPhos  92  06-27    CARDIAC MARKERS:    Troponin <0.06 (06-27 @ 12:08)  CKMB: 2.83 ng/mL (06-27 @ 12:08)      ECG: normal sinus rhythm, no ST elevations or q waves  	  RADIOLOGY:    EXAM:  RAD CHEST PA LAT        PROCEDURE DATE:  Jun 27 2017         INTERPRETATION:  CLINICAL INDICATION: chest pain    EXAM:  Upright frontal and lateral chest from 6/27/2017 at 1353. Compared to   prior study from 1/19/2016.    IMPRESSION:  Clear lungs. No pleural effusions or pneumothorax.    Left coronary stent again noted. Otherwise cardiac and mediastinal   silhouettes within normal limits.    Trachea midline.    Unremarkable osseous structures.      PREVIOUS DIAGNOSTIC TESTING:    [ ] Echocardiogram:  Echo: 2/16/17, mild MR, normal LA, diastolic dysfunction, LVEF 50%; 1/14/16, normal LA, preserved LV function, LVEF 50% ScokpJvxdonl48Fkd YxupnNfhhwpo15Apmgc XezjgLqkwimb86Dht LwgrlCgwbcsm44Tfdur DwnbmChmwlph60Reg XrkedWpuzbtn03Wbvwd LvslrXaxqqgw99Hic HnyhzPxgmwfw10Kiawr FpzwkInrcdkq69Vnb WyhfhYqjkkmn4Emtgx  [ ]  Catheterization:  9/9/13, oLAD 30%, pLAD 50%, mLAD 90% ISR, mCx 10% ISR, patent dCx stent, pOM3 40%, pRCA 20%, mRCA 20%, LVEF 65% WdlozWlvddys9Wse AzdytWaspebh84Ikwbs   Stent: 9/9/13, XPEDITION stents to pLAD 50% and mLAD 90% ISR MsnfaJtsvych82Lll ZebmpIzwuinh96Rqqad IqzzsQjvgvdq96Kzl HerxgTugmglf63Abqaw YmvgcOrztcik52Cla TfwzsDmireyr68Yeksg HwuraWaozxtx12Wbx CxkihJnkvasl95Kihby KnjfxDndsfvk86Xfc HmwukGpellqr75Uipuy YddllVqtyyxx40Uto PfbywPkngidn28Dqrin PtvbuWefdhmh68Mpn FsignLxjwbbb54Gunnr BrkyfQtuswlq18Qub EaljaFmgtixw47Wapfj GdjglDaybjkj66Ggt XorqdDmvxrkw85Mspjh MuscxGeashan01Anj NfcsxVurwtft83Xfhfi GuhhaAcltrcb45Oie HfffzfdjdpGevbrykj369a6qg-eb59-353u-s710-[id number]    [ ] Stress Test:  	  Stress Test: 1/21/16 (regadenoson MIBI), no evidence of infarction or inducible ischemia, LVEF 67% MrbliZjbklpb26Wxi HajxlFjwarde57Fbgsm    	  ASSESSMENT/PLAN: 	    The patient is a 56 year old male with a history of hypertension, CAD with stents in LAD x 3, circumflex x2, OM3, and RCA x 2, an echo on 2/16/17 showing LVEF 50%, subdural hematoma S/P craniotomy in 2015, diabetes, hypertension, and hyperlipidemia, presenting with chest pain since yesterday morning.     # chest pain: The ekg and cardiac enzymes are negative for myocardial infarction. Considering the patient's angina and longstanding history of CAD, a stress test has been ordered for tomorrow to evaluate for ACS. Possible cardiac catherization to follow if myocardium grossly ischemic.  - continue aspirin 81 mg daily    # HTN  - continue Enalapril Maleate 10 MG Oral Tablet; TAKE 1 TABLET DAILY  - continue Metoprolol Succinate ER 25 MG Oral Tablet Extended Release 24 Hour; TAKE 1 TABLET  ONCE DAILY  - continue HydroCHLOROthiazide 25 MG Oral Tablet; TAKE 1 TABLET DAILY AS DIRECTED    # Hyperlipidemia  - continue Atorvastatin Calcium 40 MG Oral Tablet; TAKE 1 TABLET DAILY AS DIRECTED  -  continue Gemfibrozil 600 MG Oral Tablet; TAKE 1 TABLET DAILY    # diabetes  - MetFORMIN HCl - 850 MG Oral Tablet; Take 1 tablet daily    # HeapI diet Date of Admission: 6/27/17    CHIEF COMPLAINT: chest pain.    HISTORY OF PRESENT ILLNESS:    The patient is a 56 year old male with a history of CAD with stents in LAD x3, circumflex x2, OM3 x1, and RCA x2, an echo on 2/16/17 showing LVEF 50%, subdural hematoma S/P craniotomy in 2015, diabetes, hypertension, and hyperlipidemia, presenting with chest pain since yesterday morning. The pain is constant, left sided, radiates to the left shoulder, jaw, and legs, and feels like a pressure with stabbing pain. He has had exertional angina for the past few weeks but the pain since yesterday morning has not subsided with rest. No nausea, vomiting, diaphoresis, SOB, or fevers. The patient's pain started to improve on his way to the hospital today. He has been taking only his normal daily medications which includes aspirin 81 mg daily.     In 2015, the patient was on dual anticoagulant therapy for the stents (plavix and aspirin) and he developed a subdural hematoma, which was treated by craniotomy and has been followed up with many MRI’s. QzdftQsyttvg02Ojr BfwxbPxlxpyd40Wpaln AxjpnCzbcyqc65Hmd QqzjhTrtrfhp49Wjwgy HwbkkXywaygm65Kbt YhbswUrkyuqn53Bzgwa DmamvQoulppj45Gjc UtujuSmxehmu77Vlxqx XajbjWuqrbta89Qnb ZbbfmHjwxcjc66Gjusu SaollYkqkmjv51Vgv ZeltkPnezphn34Jhsbz IdhfwCmznryt80Emh IdfvrYfkknnk18Jsxnk UzkohIngtnvv56Gau ViekeFfyflet09Pmylt RkxnxLrxjcpp97Zrc UfxhePniresn44Myzii DjppcXcfzbul72Zpr BvynmFmocaia65Unhjm HhdraFudtidp03Spa NnvltxjzxcRdtyhaxb120c1xz-fr15-602q-f581-[id number]  Since then, his only anticoagulant therapy has been aspirin 81 mg daily.    Cardiac cath:  9/9/13, oLAD 30%, pLAD 50%, mLAD 90% ISR, mCx 10% ISR, patent dCx stent, pOM3 40%, pRCA 20%, mRCA 20%, LVEF 65% BkegyCladuxy2Nby XhkkhTujdbxb27Czorv   Stent: 9/9/13, XPEDITION stents to pLAD 50% and mLAD 90% ISR IrgebDzcpcsh06Npu TqoezLomfice97Rmpnq XkrslQkghbab97Icg GvlfaXxwflib90Zaujc PmjhoDlemhao67Eng QiumuWalqrbr35Wipix NwnjePtkfudn33Ubk LcvbgSycwmtc16Sxjye TvnusKtyotch46Qyi AbfewEmedljb59Jjfps GclujXvoqjwe46Mau FusgcQwfppjr63Dlejh HeyxdQagqbyv82Pow HzffhDtoqqnn98Gviud HeozxUhgzwcu64Gac WjbidEjocxjq82Qjgyj GxkvtQltkbjy04Vok MchtoZgpgyho56Rnyua ZdgdxLwttdjy76Kor FejosVxfqida95Xstqd CjxzuHfbmmlg33Doa MrwdkrkyujKrycquip238j0th-jl48-738e-q003-g2h826m75474HlhgQbt         Allergies: No Known Drug Allergies    MEDICATIONS:    CurrentMeds_4_twCiteListControlStart Aspirin 81 MG TABS; TAKE 1 TABLET DAILY  Atorvastatin Calcium 40 MG Oral Tablet; TAKE 1 TABLET DAILY AS DIRECTED  Enalapril Maleate 10 MG Oral Tablet; TAKE 1 TABLET DAILY  Gemfibrozil 600 MG Oral Tablet; TAKE 1 TABLET DAILY  HydroCHLOROthiazide 25 MG Oral Tablet; TAKE 1 TABLET DAILY AS DIRECTED  MetFORMIN HCl - 850 MG Oral Tablet; Take 1 tablet daily  Metoprolol Succinate ER 25 MG Oral Tablet Extended Release 24 Hour; TAKE 1 TABLET  ONCE DAILY    PAST MEDICAL & SURGICAL HISTORY:  CAD (coronary artery disease)  Myocardial infarction: 2005  Hypercholesteremia  Hypertension  Diabetes mellitus: diet control  Subdural hematoma and craniotomy in 2015  History of coronary angiogram: stents - 4/2005 had 2 stents placed @ [facility] &amp; then 6/2005 had 3 stents placed at [facility]      FAMILY HISTORY:  Patient is unsure of pertinent family history in first degree relatives      SOCIAL HISTORY:    The patient is not working due to the head pain from the subdural hemorrhage and surgery in 2015.  Non-smoker, no illicit drugs, no alcohol          REVIEW OF SYSTEMS:  See HPI. Otherwise, 10 point ROS done and otherwise negative.    PHYSICAL EXAM:  T(C): 36.7 (06-27-17 @ 12:37), Max: 36.7 (06-27-17 @ 12:37)  HR: 73 (06-27-17 @ 13:24) (73 - 99)  BP: 130/70 (06-27-17 @ 13:24) (122/78 - 130/70)  RR: 16 (06-27-17 @ 13:24) (16 - 16)  SpO2: 100% (06-27-17 @ 13:24) (100% - 100%)      Appearance: No apparent distress	  HEENT:   Normal oral mucosa, PERRL, EOMI	  Lymphatic: No lymphadenopathy  Cardiovascular: Normal S1 S2, No JVD, No murmurs, No edema  Respiratory: Lungs clear to auscultation	  Psychiatry: A & O x 3, Mood & affect appropriate  Gastrointestinal:  Soft, Non-tender, + BS	  Skin: No rashes, No ecchymoses, No cyanosis	  Neurologic: Non-focal  Extremities: Normal range of motion, No clubbing, cyanosis or edema  Vascular: Peripheral pulses palpable 2+ bilaterally        LABS:	 	    CBC Full  -  ( 27 Jun 2017 12:08 )  WBC Count : 5.64 K/uL  Hemoglobin : 12.4 g/dL  Hematocrit : 38.5 %  Platelet Count - Automated : 267 K/uL  Mean Cell Volume : 82.8 fL  Mean Cell Hemoglobin : 26.7 pg  Mean Cell Hemoglobin Concentration : 32.2 %  Auto Neutrophil # : 2.66 K/uL  Auto Lymphocyte # : 2.12 K/uL  Auto Monocyte # : 0.44 K/uL  Auto Eosinophil # : 0.34 K/uL  Auto Basophil # : 0.06 K/uL  Auto Neutrophil % : 47.1 %  Auto Lymphocyte % : 37.6 %  Auto Monocyte % : 7.8 %  Auto Eosinophil % : 6.0 %  Auto Basophil % : 1.1 %    06-27    140  |  99  |  16  ----------------------------<  137<H>  4.0   |  26  |  1.03    Ca    9.7      27 Jun 2017 12:08    TPro  7.6  /  Alb  4.5  /  TBili  0.6  /  DBili  x   /  AST  32  /  ALT  29  /  AlkPhos  92  06-27    CARDIAC MARKERS:    Troponin <0.06 (06-27 @ 12:08)  CKMB: 2.83 ng/mL (06-27 @ 12:08)      ECG: normal sinus rhythm, no ST elevations or q waves  	  RADIOLOGY:    EXAM:  RAD CHEST PA LAT        PROCEDURE DATE:  Jun 27 2017         INTERPRETATION:  CLINICAL INDICATION: chest pain    EXAM:  Upright frontal and lateral chest from 6/27/2017 at 1353. Compared to   prior study from 1/19/2016.    IMPRESSION:  Clear lungs. No pleural effusions or pneumothorax.    Left coronary stent again noted. Otherwise cardiac and mediastinal   silhouettes within normal limits.    Trachea midline.    Unremarkable osseous structures.      PREVIOUS DIAGNOSTIC TESTING:    [ ] Echocardiogram:  Echo: 2/16/17, mild MR, normal LA, diastolic dysfunction, LVEF 50%; 1/14/16, normal LA, preserved LV function, LVEF 50% VryotKllxdbf47Gao HomuaYuowbrp60Catqc RefycZbssvun32Arh NbozlSqahzlu56Sfubc EvcspRcmwcic37Mzc WhrreUiqlrxn97Mgkdn VeqjuGondvpa45Pny PxlfaKjocqym13Agxjs CcdjfKfnmgbg72Mmi AwdihOfspcmq5Sssmg  [ ]  Catheterization:  9/9/13, oLAD 30%, pLAD 50%, mLAD 90% ISR, mCx 10% ISR, patent dCx stent, pOM3 40%, pRCA 20%, mRCA 20%, LVEF 65% PriirXlefpzo6Vwo PdbexWxpzcip01Nmqwq   Stent: 9/9/13, XPEDITION stents to pLAD 50% and mLAD 90% ISR PanxoPzzsevo15Cwp ZqqluDfvdyik71Dfziq DjdigMasnzuq77Yva FlbhbDrujeyw58Qqezy QezeeAtneurf41Fml EyesmArnfant48Lipib PbunvQnekcca26Kvc AkxgmFilbcjs86Szoug RgiwkEymfojd83Xmr RslnjZqwsdcj24Ponqf JssiqLkfouzh35Nea ItgxpDqlnyev59Ejlua YmemyXdfxyze69Lhn EmbpjVuctrve84Acuvv CzyccXcyhjxi96Dub DvopnWmliysu24Ihkjh MxitjAmkuwpp12Wun CyixnDtbzmdh25Xtfye WffvxSpgebrm38Wwq FabvaEmxanff14Rtipp EazhfKbhhlhu77Gwy RlaxkvpmcwNxpfrkbj410u8xh-[id number]    [ ] Stress Test:  	  Stress Test: 1/21/16 (regadenoson MIBI), no evidence of infarction or inducible ischemia, LVEF 67% UxgrqDjdellh63Uef EikgdHoxrbcp74Reobm    	  ASSESSMENT/PLAN: 	    The patient is a 56 year old male with a history of hypertension, CAD with stents in LAD x 3, circumflex x2, OM3, and RCA x 2, an echo on 2/16/17 showing LVEF 50%, subdural hematoma S/P craniotomy in 2015, diabetes, hypertension, and hyperlipidemia, presenting with chest pain since yesterday morning.     # chest pain: The ekg and cardiac enzymes are negative for myocardial infarction. Considering the patient's angina and longstanding history of CAD, a stress test has been ordered for tomorrow to evaluate for ACS. Possible cardiac catherization to follow if myocardium grossly ischemic.  - continue aspirin 81 mg daily    # HTN  - continue Enalapril Maleate 10 MG Oral Tablet; TAKE 1 TABLET DAILY  - continue Metoprolol Succinate ER 25 MG Oral Tablet Extended Release 24 Hour; TAKE 1 TABLET  ONCE DAILY  - continue HydroCHLOROthiazide 25 MG Oral Tablet; TAKE 1 TABLET DAILY AS DIRECTED    # Hyperlipidemia  - continue Atorvastatin Calcium 40 MG Oral Tablet; TAKE 1 TABLET DAILY AS DIRECTED  -  continue Gemfibrozil 600 MG Oral Tablet; TAKE 1 TABLET DAILY    # diabetes  - MetFORMIN HCl - 850 MG Oral Tablet; Take 1 tablet daily    # PRACHII diet  #admit to cardiology primary under dr soliman

## 2017-06-27 NOTE — ED PROVIDER NOTE - PROGRESS NOTE DETAILS
Patrick Croft MD PGY3: Labs, imaging reviewed. Seen by cardiology, accepted to service. Telemetry PA signout @ [phone].

## 2017-06-27 NOTE — H&P ADULT - PROBLEM SELECTOR PLAN 1
tele monitor   cardiac enzymes x 2  Serial EKGs  DASH 1800 ADA diet  continue ASA, lipitor, enalapril, metoprolol and gemfibrozil.  FLP, HgA1c  consider NST  House Cards consult appreciated

## 2017-06-27 NOTE — H&P ADULT - PSH
Acute intracerebral hemorrhage    History of coronary angiogram  stents - 4/2005 had 2 stents placed @ [facility] & then 6/2005 had 3 stents placed at [facility]  Stented coronary artery  x8, last 3 in 2015

## 2017-06-27 NOTE — ED PROVIDER NOTE - PMH
CAD (coronary artery disease)    Diabetes mellitus  diet control  Hypercholesteremia    Hypertension    Myocardial infarction  2005

## 2017-06-27 NOTE — H&P ADULT - HISTORY OF PRESENT ILLNESS
55 yo male PMHx of HTN, DM, ICH, HLD, CAD and MI wit 8 cardiac stents p/w left sided chest pain intermittent for 2 weeks. Chest pain described as sharp, sticking pain, 7/10, radiating to the L shoulder and neck, non-pleuritic in nature. This morning pt developed cold sweat with chest pain. Chest pain is intermittent in nature, may occur at rest or on exertion, each episode lasting for 10-15 mins. No alleviating or exacerbating factors. Pt denies fever, chills, palpitations, nausea, vomiting, SOB or abdominal pain.

## 2017-06-27 NOTE — H&P ADULT - ASSESSMENT
57 yo male PMHx of HTN, DM, ICH, HLD, CAD and MI wit 8 cardiac stents p/w left sided chest pain intermittent for 2 weeks admitted to tele for angina pectoris, r/o ACS.

## 2017-06-27 NOTE — ED ADULT NURSE NOTE - OBJECTIVE STATEMENT
Pt presents to ED R#23 AOx4, in NAD, c/o L-sided CP waxing and waning x2 days, today pain has become constant and more intense, states somewhat less upon arrival to ED now at 4/10. Describes pain as "sharp", with L shoulder radiation. Also reports some SOB with CP. States pain is worse when lying flat. Denies palpitations/ dizziness/ weakness/ diaphoresis/ syncope/ abd pain/ N/V/ other acute complaints. EKG completed at triage. NSR on CM. VSS. IV inserted, BW collected and sent to lab. Seen by Dr Staley. Awaiting lab results and CXRAY. Will continue to monitor.

## 2017-06-27 NOTE — ED PROVIDER NOTE - PHYSICAL EXAMINATION
ATTENDING PHYSICAL EXAM DR. MCRAE ***GEN - NAD; well appearing; A+O x3 ***HEAD - NC/AT ***EYES/NOSE - PERRL, EOMI, mucous membranes moist, no discharge  ***PULMONARY - CTA b/l, symmetric breath sounds. ***CARDIAC -s1s2, RRR, no M,G,R  ***ABDOMEN - +BS, ND, NT, soft, no guarding, no rebound, no masses   ***BACK - no CVA tenderness, Normal  spine ***EXTREMITIES - symmetric pulses, 2+ dp, capillary refill < 2 seconds, no clubbing, no cyanosis, no edema ***SKIN - no rash or bruising   ***NEUROLOGIC - alert

## 2017-06-28 LAB
BUN SERPL-MCNC: 17 MG/DL — SIGNIFICANT CHANGE UP (ref 7–23)
CALCIUM SERPL-MCNC: 9.8 MG/DL — SIGNIFICANT CHANGE UP (ref 8.4–10.5)
CHLORIDE SERPL-SCNC: 99 MMOL/L — SIGNIFICANT CHANGE UP (ref 98–107)
CHOLEST SERPL-MCNC: 132 MG/DL — SIGNIFICANT CHANGE UP (ref 120–199)
CO2 SERPL-SCNC: 28 MMOL/L — SIGNIFICANT CHANGE UP (ref 22–31)
CREAT SERPL-MCNC: 0.9 MG/DL — SIGNIFICANT CHANGE UP (ref 0.5–1.3)
GLUCOSE SERPL-MCNC: 121 MG/DL — HIGH (ref 70–99)
HCT VFR BLD CALC: 37.2 % — LOW (ref 39–50)
HDLC SERPL-MCNC: 40 MG/DL — SIGNIFICANT CHANGE UP (ref 35–55)
HGB BLD-MCNC: 12.2 G/DL — LOW (ref 13–17)
LIPID PNL WITH DIRECT LDL SERPL: 91 MG/DL — SIGNIFICANT CHANGE UP
MCHC RBC-ENTMCNC: 27.2 PG — SIGNIFICANT CHANGE UP (ref 27–34)
MCHC RBC-ENTMCNC: 32.8 % — SIGNIFICANT CHANGE UP (ref 32–36)
MCV RBC AUTO: 82.9 FL — SIGNIFICANT CHANGE UP (ref 80–100)
PLATELET # BLD AUTO: 255 K/UL — SIGNIFICANT CHANGE UP (ref 150–400)
PMV BLD: 10.5 FL — SIGNIFICANT CHANGE UP (ref 7–13)
POTASSIUM SERPL-MCNC: 3.7 MMOL/L — SIGNIFICANT CHANGE UP (ref 3.5–5.3)
POTASSIUM SERPL-SCNC: 3.7 MMOL/L — SIGNIFICANT CHANGE UP (ref 3.5–5.3)
RBC # BLD: 4.49 M/UL — SIGNIFICANT CHANGE UP (ref 4.2–5.8)
RBC # FLD: 13.4 % — SIGNIFICANT CHANGE UP (ref 10.3–14.5)
SODIUM SERPL-SCNC: 141 MMOL/L — SIGNIFICANT CHANGE UP (ref 135–145)
TRIGL SERPL-MCNC: 98 MG/DL — SIGNIFICANT CHANGE UP (ref 10–149)
WBC # BLD: 6.02 K/UL — SIGNIFICANT CHANGE UP (ref 3.8–10.5)
WBC # FLD AUTO: 6.02 K/UL — SIGNIFICANT CHANGE UP (ref 3.8–10.5)

## 2017-06-28 RX ORDER — METOPROLOL TARTRATE 50 MG
25 TABLET ORAL DAILY
Qty: 0 | Refills: 0 | Status: DISCONTINUED | OUTPATIENT
Start: 2017-06-28 | End: 2017-06-30

## 2017-06-28 RX ORDER — GEMFIBROZIL 600 MG
600 TABLET ORAL DAILY
Qty: 0 | Refills: 0 | Status: DISCONTINUED | OUTPATIENT
Start: 2017-06-28 | End: 2017-06-30

## 2017-06-28 RX ORDER — HYDROCHLOROTHIAZIDE 25 MG
25 TABLET ORAL DAILY
Qty: 0 | Refills: 0 | Status: DISCONTINUED | OUTPATIENT
Start: 2017-06-28 | End: 2017-06-30

## 2017-06-28 RX ORDER — ASPIRIN/CALCIUM CARB/MAGNESIUM 324 MG
81 TABLET ORAL DAILY
Qty: 0 | Refills: 0 | Status: DISCONTINUED | OUTPATIENT
Start: 2017-06-28 | End: 2017-06-30

## 2017-06-28 RX ADMIN — Medication 10 MILLIGRAM(S): at 17:38

## 2017-06-28 RX ADMIN — Medication 600 MILLIGRAM(S): at 22:15

## 2017-06-28 RX ADMIN — Medication: at 13:12

## 2017-06-28 RX ADMIN — Medication 25 MILLIGRAM(S): at 17:38

## 2017-06-28 RX ADMIN — ATORVASTATIN CALCIUM 40 MILLIGRAM(S): 80 TABLET, FILM COATED ORAL at 22:15

## 2017-06-28 RX ADMIN — Medication 81 MILLIGRAM(S): at 17:38

## 2017-06-28 NOTE — PROGRESS NOTE ADULT - SUBJECTIVE AND OBJECTIVE BOX
Date of Admission: 6/27/17    24 hour events: Patient reports one 2-3 minute episode of chest pain last night at 11:00 p.m. The pain was stabbing, non-radiating, and increased when he palpated his chest. No chest pain to palpation this morning. No SOB, diaphoresis, nausea or vomiting.        MEDICATIONS:    Aspirin 81 MG TABS; TAKE 1 TABLET DAILY  Atorvastatin Calcium 40 MG Oral Tablet; TAKE 1 TABLET DAILY AS DIRECTED  Enalapril Maleate 10 MG Oral Tablet; TAKE 1 TABLET DAILY  Gemfibrozil 600 MG Oral Tablet; TAKE 1 TABLET DAILY  HydroCHLOROthiazide 25 MG Oral Tablet; TAKE 1 TABLET DAILY AS DIRECTED  MetFORMIN HCl - 850 MG Oral Tablet; Take 1 tablet daily  Metoprolol Succinate ER 25 MG Oral Tablet Extended Release 24 Hour; TAKE 1 TABLET  ONCE DAILY      REVIEW OF SYSTEMS:  Complete 10point ROS negative.    PHYSICAL EXAM:  T(C): 36.5 (06-28-17 @ 05:08), Max: 36.7 (06-27-17 @ 12:37)  HR: 55 (06-28-17 @ 05:08) (55 - 99)  BP: 128/86 (06-28-17 @ 05:08) (104/70 - 130/70)  RR: 17 (06-28-17 @ 05:08) (16 - 17)  SpO2: 100% (06-28-17 @ 05:08) (100% - 100%)  Wt(kg): --  I&O's Summary      Appearance: Normal	  HEENT:   Normal oral mucosa, PERRL, EOMI	  Lymphatic: No lymphadenopathy  Cardiovascular: Normal S1 S2, No JVD, No murmurs, No edema  Respiratory: Lungs clear to auscultation	  Psychiatry: A & O x 3, Mood & affect appropriate  Gastrointestinal:  Soft, Non-tender, + BS	  Skin: No rashes, No ecchymoses, No cyanosis	  Neurologic: Non-focal  Extremities: Normal range of motion, No clubbing, cyanosis or edema  Vascular: Peripheral pulses palpable 2+ bilaterally        LABS:	 	    CBC Full  -  ( 28 Jun 2017 06:30 )  WBC Count : 6.02 K/uL  Hemoglobin : 12.2 g/dL  Hematocrit : 37.2 %  Platelet Count - Automated : 255 K/uL  Mean Cell Volume : 82.9 fL  Mean Cell Hemoglobin : 27.2 pg  Mean Cell Hemoglobin Concentration : 32.8 %  Auto Neutrophil # : x  Auto Lymphocyte # : x  Auto Monocyte # : x  Auto Eosinophil # : x  Auto Basophil # : x  Auto Neutrophil % : x  Auto Lymphocyte % : x  Auto Monocyte % : x  Auto Eosinophil % : x  Auto Basophil % : x    06-28    141  |  99  |  17  ----------------------------<  121<H>  3.7   |  28  |  0.90  06-27    140  |  99  |  16  ----------------------------<  137<H>  4.0   |  26  |  1.03    Ca    9.8      28 Jun 2017 06:30  Ca    9.7      27 Jun 2017 12:08    TPro  7.6  /  Alb  4.5  /  TBili  0.6  /  DBili  x   /  AST  32  /  ALT  29  /  AlkPhos  92  06-27    HgA1c: 7.7 (6-27-17 19:20)        CARDIAC MARKERS:  <0.06 6-27-17 19:20  <0.06 6-27-17 12:08        ECG: normal sinus rhythm, no ST elevations or q waves  	  RADIOLOGY:    EXAM:  RAD CHEST PA LAT        PROCEDURE DATE:  Jun 27 2017     INTERPRETATION:  CLINICAL INDICATION: chest pain    EXAM:  Upright frontal and lateral chest from 6/27/2017 at 1353. Compared to   prior study from 1/19/2016.    IMPRESSION:  Clear lungs. No pleural effusions or pneumothorax.    Left coronary stent again noted. Otherwise cardiac and mediastinal   silhouettes within normal limits.    Trachea midline.    Unremarkable osseous structures.      PREVIOUS DIAGNOSTIC TESTING:    [ ] Echocardiogram:  Echo: 2/16/17, mild MR, normal LA, diastolic dysfunction, LVEF 50%; 1/14/16, normal LA, preserved LV function, LVEF 50% GdcbqAgzhreb44Pkd RygdnHouwmdo03Vxypa IwfqeTnbztzc52Mfs UyewuKphdjln40Eegyz TnjgmQskdxbl81Oua JaxskUqqqkks29Opajc TeyhsMlulgms74Yko RxadaDijkjwh92Bqcfh VzhgyNyckqvy59Qbg RxiypGmgzsqe5Rgffx  [ ]  Catheterization:  9/9/13, oLAD 30%, pLAD 50%, mLAD 90% ISR, mCx 10% ISR, patent dCx stent, pOM3 40%, pRCA 20%, mRCA 20%, LVEF 65% QejwnJhhvbft5Zjd OllqeUnoqfdl39Tfvos   Stent: 9/9/13, XPEDITION stents to pLAD 50% and mLAD 90% ISR QejbaJwfpibi36Ura NjoewVpmdybs81Epppe HrsitCzjmlkt40Fjz IccefSvoqqsv20Erais JzdcaZqbsxyv62Pks GbeapLsccbfu45Xvpag ObbssOzbbfco48Snd KrpcdHdovnkz53Qgtgu DpcohHfjkusu90Rao HydvuMpdsfjl85Wtawo FptfsZwjdntt38Gqc IvxcqFhreulm46Cydci OmumiVpxwsvc93Suc ZjcgoIlcenbl12Nptpv WodtsNgjjsbh69Tjp LvnpfBzkjyhu09Zkwvy SxbarVchfejr22Hzb EfjocIggorru67Uemqm ObjpeGxyfdek47Lei NldrjBtuqvxc86Gbtkx NqrhzTmatmqt94Fnr GfsseiznhnMahymdns967o1nh-gk95-286r-[id number]    [ ] Stress Test:  	  Stress Test: 1/21/16 (regadenoson MIBI), no evidence of infarction or inducible ischemia, LVEF 67% SzexkQwjfmcg08Qeq CrhqkCnhczpl15Ecgab    ASSESSMENT/PLAN: 	    ASSESSMENT/PLAN: 	    The patient is a 56 year old male with a history of hypertension, CAD with stents in LAD x 3, circumflex x2, OM3, and RCA x 2, an echo on 2/16/17 showing LVEF 50%, subdural hematoma S/P craniotomy in 2015, diabetes, hypertension, and hyperlipidemia, presenting with chest pain since yesterday morning.     # chest pain: The ekg and cardiac enzymes are negative for myocardial infarction. Considering the patient's angina and longstanding history of CAD, a stress test has been scheduled for today to evaluate for ACS. Possible cardiac catheterization to follow if myocardium grossly ischemic. Possible costo  - continue aspirin 81 mg daily    # HTN  - continue Enalapril Maleate 10 MG Oral Tablet; TAKE 1 TABLET DAILY  - continue Metoprolol Succinate ER 25 MG Oral Tablet Extended Release 24 Hour; TAKE 1 TABLET  ONCE DAILY  - continue HydroCHLOROthiazide 25 MG Oral Tablet; TAKE 1 TABLET DAILY AS DIRECTED    # Hyperlipidemia  - continue Atorvastatin Calcium 40 MG Oral Tablet; TAKE 1 TABLET DAILY AS DIRECTED  -  continue Gemfibrozil 600 MG Oral Tablet; TAKE 1 TABLET DAILY    # diabetes  - MetFORMIN HCl - 850 MG Oral Tablet; Take 1 tablet daily    # [place] [id number] Date of Admission: 6/27/17    24 hour events: Patient reports one 2-3 minute episode of chest pain last night at 11:00 p.m. The pain was stabbing, non-radiating, and increased when he palpated his chest. No chest pain to palpation this morning. No SOB, diaphoresis, nausea or vomiting.        MEDICATIONS:    Aspirin 81 MG TABS; TAKE 1 TABLET DAILY  Atorvastatin Calcium 40 MG Oral Tablet; TAKE 1 TABLET DAILY AS DIRECTED  Enalapril Maleate 10 MG Oral Tablet; TAKE 1 TABLET DAILY  Gemfibrozil 600 MG Oral Tablet; TAKE 1 TABLET DAILY  HydroCHLOROthiazide 25 MG Oral Tablet; TAKE 1 TABLET DAILY AS DIRECTED  MetFORMIN HCl - 850 MG Oral Tablet; Take 1 tablet daily  Metoprolol Succinate ER 25 MG Oral Tablet Extended Release 24 Hour; TAKE 1 TABLET  ONCE DAILY      REVIEW OF SYSTEMS:  Complete 10point ROS negative.    PHYSICAL EXAM:  T(C): 36.5 (06-28-17 @ 05:08), Max: 36.7 (06-27-17 @ 12:37)  HR: 55 (06-28-17 @ 05:08) (55 - 99)  BP: 128/86 (06-28-17 @ 05:08) (104/70 - 130/70)  RR: 17 (06-28-17 @ 05:08) (16 - 17)  SpO2: 100% (06-28-17 @ 05:08) (100% - 100%)  Wt(kg): --  I&O's Summary      Appearance: Normal	  HEENT:   Normal oral mucosa, PERRL, EOMI	  Lymphatic: No lymphadenopathy  Cardiovascular: Normal S1 S2, No JVD, No murmurs, No edema  Respiratory: Lungs clear to auscultation	  Psychiatry: A & O x 3, Mood & affect appropriate  Gastrointestinal:  Soft, Non-tender, + BS	  Skin: No rashes, No ecchymoses, No cyanosis	  Neurologic: Non-focal  Extremities: Normal range of motion, No clubbing, cyanosis or edema  Vascular: Peripheral pulses palpable 2+ bilaterally        LABS:	 	    CBC Full  -  ( 28 Jun 2017 06:30 )  WBC Count : 6.02 K/uL  Hemoglobin : 12.2 g/dL  Hematocrit : 37.2 %  Platelet Count - Automated : 255 K/uL  Mean Cell Volume : 82.9 fL  Mean Cell Hemoglobin : 27.2 pg  Mean Cell Hemoglobin Concentration : 32.8 %  Auto Neutrophil # : x  Auto Lymphocyte # : x  Auto Monocyte # : x  Auto Eosinophil # : x  Auto Basophil # : x  Auto Neutrophil % : x  Auto Lymphocyte % : x  Auto Monocyte % : x  Auto Eosinophil % : x  Auto Basophil % : x    06-28    141  |  99  |  17  ----------------------------<  121<H>  3.7   |  28  |  0.90  06-27    140  |  99  |  16  ----------------------------<  137<H>  4.0   |  26  |  1.03    Ca    9.8      28 Jun 2017 06:30  Ca    9.7      27 Jun 2017 12:08    TPro  7.6  /  Alb  4.5  /  TBili  0.6  /  DBili  x   /  AST  32  /  ALT  29  /  AlkPhos  92  06-27    HgA1c: 7.7 (6-27-17 19:20)        CARDIAC MARKERS:  <0.06 6-27-17 19:20  <0.06 6-27-17 12:08        ECG: normal sinus rhythm, no ST elevations or q waves  	  RADIOLOGY:    EXAM:  RAD CHEST PA LAT        PROCEDURE DATE:  Jun 27 2017     INTERPRETATION:  CLINICAL INDICATION: chest pain    EXAM:  Upright frontal and lateral chest from 6/27/2017 at 1353. Compared to   prior study from 1/19/2016.    IMPRESSION:  Clear lungs. No pleural effusions or pneumothorax.    Left coronary stent again noted. Otherwise cardiac and mediastinal   silhouettes within normal limits.    Trachea midline.    Unremarkable osseous structures.      PREVIOUS DIAGNOSTIC TESTING:    [ ] Echocardiogram:  Echo: 2/16/17, mild MR, normal LA, diastolic dysfunction, LVEF 50%; 1/14/16, normal LA, preserved LV function, LVEF 50% QvhmpCmihbld01Yra GejdaYkjsmom31Koiyu CjyneGtzjizb65Qeb WtfkhKbhqhha04Ngatf AlcxsHefcjuu28Rht CfcbjYjkmacv02Snlam CvnyhQfqikfy28Rta EqzqwMwqjwbg78Udkll SbrpsIlkaopx16Hdy MsryeBtmzuis6Xsqbw  [ ]  Catheterization:  9/9/13, oLAD 30%, pLAD 50%, mLAD 90% ISR, mCx 10% ISR, patent dCx stent, pOM3 40%, pRCA 20%, mRCA 20%, LVEF 65% NlfqjTmoruvo2Yjz WkajbFclfdfq47Ipxdl   Stent: 9/9/13, XPEDITION stents to pLAD 50% and mLAD 90% ISR MxhmuFzfynoj93Dzc BhgumGgsubgn38Jjonu GjnfcWtsutyr14Ymu EdwmmOctpxxw87Egeia IlkbxOrfqgwl58Okw BtqraPijieta86Tjrcg MjcvnJmfxerb28Hlq UkgplTersbir07Icltd WgroiMeoyntz33Bym CxjkvRwkeqnw98Fglve MglecBumxbsj34Fou TlwptYbjdrdc60Yzpjn AezmqQitzohn36Jbu EnzdpJrnwnfj71Ybssc YukzjSjojaju12Byp OmszlIwkosoi20Gyiza PemprVewwguq01Fet TxowdSicnhzm96Xkycv DasrnYxtyykh66Rfr WspnwDbzioox48Bkrjt MlpgtKkrlvrv77Mbx OkflbsmgsqHsvlbgmt240w1vf-tk77-928e-s282-[id number]    [ ] Stress Test:  	  Stress Test: 1/21/16 (regadenoson MIBI), no evidence of infarction or inducible ischemia, LVEF 67% UqhqdWifqcct57Amm ZzfgvYxthlpr15Dcefv      ASSESSMENT/PLAN: 	    The patient is a 56 year old male with a history of hypertension, CAD with DANTE in LAD x 3, circumflex x2, OM3, and RCA x 2, preserved LVEF 50%, subdural hematoma S/P craniotomy in 2015, diabetes, hypertension, and hyperlipidemia, presenting with chest pain since yesterday morning.     # chest pain: The ekg and cardiac enzymes are negative for myocardial infarction. Considering the patient's angina and longstanding history of CAD, a stress test has been scheduled for today to evaluate for ACS. Possible cardiac catheterization to follow if myocardium grossly ischemic.   - continue aspirin 81 mg daily    # HTN  - continue Enalapril Maleate 5 MG Oral Tablet. If needed, can put holding parameter of sbp<90. ACEs are indicated given his CAD   - please resume his home Metoprolol Succinate ER 25 MG . If needed, can put holding parameter of HR<50. Bblockers has proven mortality benefit in CAD and is a good anti-anginal medication.    # Hyperlipidemia  - continue Atorvastatin Calcium 40 MG Oral Tablet; TAKE 1 TABLET DAILY AS DIRECTED  -  continue Gemfibrozil 600 MG Oral Tablet; TAKE 1 TABLET DAILY    # diabetes  - MetFORMIN HCl - 850 MG Oral Tablet; Take 1 tablet daily    # PRACHII leonidas [place] [id number]

## 2017-06-29 DIAGNOSIS — R69 ILLNESS, UNSPECIFIED: ICD-10-CM

## 2017-06-29 LAB
BUN SERPL-MCNC: 17 MG/DL — SIGNIFICANT CHANGE UP (ref 7–23)
CALCIUM SERPL-MCNC: 9.8 MG/DL — SIGNIFICANT CHANGE UP (ref 8.4–10.5)
CHLORIDE SERPL-SCNC: 99 MMOL/L — SIGNIFICANT CHANGE UP (ref 98–107)
CO2 SERPL-SCNC: 27 MMOL/L — SIGNIFICANT CHANGE UP (ref 22–31)
CREAT SERPL-MCNC: 0.98 MG/DL — SIGNIFICANT CHANGE UP (ref 0.5–1.3)
GLUCOSE SERPL-MCNC: 136 MG/DL — HIGH (ref 70–99)
HCT VFR BLD CALC: 37.9 % — LOW (ref 39–50)
HGB BLD-MCNC: 12.6 G/DL — LOW (ref 13–17)
MCHC RBC-ENTMCNC: 27 PG — SIGNIFICANT CHANGE UP (ref 27–34)
MCHC RBC-ENTMCNC: 33.2 % — SIGNIFICANT CHANGE UP (ref 32–36)
MCV RBC AUTO: 81.3 FL — SIGNIFICANT CHANGE UP (ref 80–100)
NRBC # FLD: 0 — SIGNIFICANT CHANGE UP
PLATELET # BLD AUTO: 243 K/UL — SIGNIFICANT CHANGE UP (ref 150–400)
PMV BLD: 10.5 FL — SIGNIFICANT CHANGE UP (ref 7–13)
POTASSIUM SERPL-MCNC: 3.7 MMOL/L — SIGNIFICANT CHANGE UP (ref 3.5–5.3)
POTASSIUM SERPL-SCNC: 3.7 MMOL/L — SIGNIFICANT CHANGE UP (ref 3.5–5.3)
RBC # BLD: 4.66 M/UL — SIGNIFICANT CHANGE UP (ref 4.2–5.8)
RBC # FLD: 13.2 % — SIGNIFICANT CHANGE UP (ref 10.3–14.5)
SODIUM SERPL-SCNC: 141 MMOL/L — SIGNIFICANT CHANGE UP (ref 135–145)
WBC # BLD: 5.92 K/UL — SIGNIFICANT CHANGE UP (ref 3.8–10.5)
WBC # FLD AUTO: 5.92 K/UL — SIGNIFICANT CHANGE UP (ref 3.8–10.5)

## 2017-06-29 PROCEDURE — 93018 CV STRESS TEST I&R ONLY: CPT | Mod: GC

## 2017-06-29 PROCEDURE — 78452 HT MUSCLE IMAGE SPECT MULT: CPT | Mod: 26

## 2017-06-29 PROCEDURE — 93016 CV STRESS TEST SUPVJ ONLY: CPT | Mod: GC

## 2017-06-29 RX ORDER — ACETAMINOPHEN 500 MG
650 TABLET ORAL EVERY 6 HOURS
Qty: 0 | Refills: 0 | Status: DISCONTINUED | OUTPATIENT
Start: 2017-06-29 | End: 2017-06-30

## 2017-06-29 RX ADMIN — Medication 1: at 12:55

## 2017-06-29 RX ADMIN — Medication 650 MILLIGRAM(S): at 01:40

## 2017-06-29 RX ADMIN — Medication 650 MILLIGRAM(S): at 01:02

## 2017-06-29 RX ADMIN — Medication 25 MILLIGRAM(S): at 06:12

## 2017-06-29 RX ADMIN — Medication 600 MILLIGRAM(S): at 12:56

## 2017-06-29 RX ADMIN — Medication 81 MILLIGRAM(S): at 12:56

## 2017-06-29 RX ADMIN — Medication 10 MILLIGRAM(S): at 06:12

## 2017-06-29 RX ADMIN — ATORVASTATIN CALCIUM 40 MILLIGRAM(S): 80 TABLET, FILM COATED ORAL at 21:44

## 2017-06-29 NOTE — PROGRESS NOTE ADULT - SUBJECTIVE AND OBJECTIVE BOX
Date of Admission: 6/27/17    24H hour events: No acute overnight events. The patient needed acetaminophen 650 mg for a headache at 1:00 a.m., which resolved the pain. He is currently undergoing nuclear pharmacologic stress test and is doing well. No chest pain, SOB, diaphoresis, nausea, or vomiting.    MEDICATIONS:  aspirin enteric coated 81 milliGRAM(s) Oral daily  enalapril 10 milliGRAM(s) Oral daily  hydrochlorothiazide 25 milliGRAM(s) Oral daily  metoprolol succinate ER 25 milliGRAM(s) Oral daily  acetaminophen   Tablet. 650 milliGRAM(s) Oral every 6 hours PRN  insulin lispro (HumaLOG) corrective regimen sliding scale   SubCutaneous three times a day before meals  insulin lispro (HumaLOG) corrective regimen sliding scale   SubCutaneous at bedtime  dextrose Gel 1 Dose(s) Oral once PRN  dextrose 50% Injectable 12.5 Gram(s) IV Push once  dextrose 50% Injectable 25 Gram(s) IV Push once  dextrose 50% Injectable 25 Gram(s) IV Push once  glucagon  Injectable 1 milliGRAM(s) IntraMuscular once PRN  atorvastatin 40 milliGRAM(s) Oral at bedtime  gemfibrozil 600 milliGRAM(s) Oral daily        REVIEW OF SYSTEMS:  Complete 10point ROS negative.    PHYSICAL EXAM:  T(C): 36.4 (06-29-17 @ 06:07), Max: 36.7 (06-28-17 @ 17:12)  HR: 72 (06-29-17 @ 06:07) (72 - 79)  BP: 120/76 (06-29-17 @ 06:07) (120/76 - 129/95)  RR: 17 (06-29-17 @ 06:07) (16 - 18)  SpO2: 98% (06-29-17 @ 06:07) (98% - 100%)  Wt(kg): --  I&O's Summary      Appearance: Normal	  HEENT:   Normal oral mucosa, PERRL, EOMI	  Lymphatic: No lymphadenopathy  Cardiovascular: Normal S1 S2, No JVD, No murmurs, No edema  Respiratory: Lungs clear to auscultation	  Psychiatry: A & O x 3, Mood & affect appropriate  Gastrointestinal:  Soft, Non-tender, + BS	  Skin: No rashes, No ecchymoses, No cyanosis	  Neurologic: Non-focal  Extremities: Normal range of motion, No clubbing, cyanosis or edema  Vascular: Peripheral pulses palpable 2+ bilaterally        LABS:	 	    CBC Full  -  ( 29 Jun 2017 06:30 )  WBC Count : 5.92 K/uL  Hemoglobin : 12.6 g/dL  Hematocrit : 37.9 %  Platelet Count - Automated : 243 K/uL  Mean Cell Volume : 81.3 fL  Mean Cell Hemoglobin : 27.0 pg  Mean Cell Hemoglobin Concentration : 33.2 %  Auto Neutrophil # : x  Auto Lymphocyte # : x  Auto Monocyte # : x  Auto Eosinophil # : x  Auto Basophil # : x  Auto Neutrophil % : x  Auto Lymphocyte % : x  Auto Monocyte % : x  Auto Eosinophil % : x  Auto Basophil % : x    06-29    141  |  99  |  17  ----------------------------<  136<H>  3.7   |  27  |  0.98  06-28    141  |  99  |  17  ----------------------------<  121<H>  3.7   |  28  |  0.90    Ca    9.8      29 Jun 2017 06:30  Ca    9.8      28 Jun 2017 06:30    TPro  7.6  /  Alb  4.5  /  TBili  0.6  /  DBili  x   /  AST  32  /  ALT  29  /  AlkPhos  92  06-27      HgA1c: 7.7 (6-27-17 19:20)      CARDIAC MARKERS:  <0.06 6-27-17 19:20  <0.06 6-27-17 12:08        ECG: normal sinus rhythm, no ST elevations or q waves  	  RADIOLOGY:    EXAM:  RAD CHEST PA LAT        PROCEDURE DATE:  Jun 27 2017     INTERPRETATION:  CLINICAL INDICATION: chest pain    EXAM:  Upright frontal and lateral chest from 6/27/2017 at 1353. Compared to   prior study from 1/19/2016.    IMPRESSION:  Clear lungs. No pleural effusions or pneumothorax.    Left coronary stent again noted. Otherwise cardiac and mediastinal   silhouettes within normal limits.    Trachea midline.    Unremarkable osseous structures.      PREVIOUS DIAGNOSTIC TESTING:    [ ] Echocardiogram:  Echo: 2/16/17, mild MR, normal LA, diastolic dysfunction, LVEF 50%; 1/14/16, normal LA, preserved LV function, LVEF 50%   [ ]  Catheterization:  9/9/13, oLAD 30%, pLAD 50%, mLAD 90% ISR, mCx 10% ISR, patent dCx stent, pOM3 40%, pRCA 20%, mRCA 20%, LVEF 65%   Stent: 9/9/13, XPEDITION stents to pLAD 50% and mLAD 90% ISR   [ ] Stress Test:  	  Stress Test: 1/21/16 (regadenoson MIBI), no evidence of infarction or inducible ischemia, LVEF 67%       ASSESSMENT/PLAN: 	    The patient is a 56 year old male with a history of hypertension, CAD with DANTE in LAD x 3, circumflex x2, OM3, and RCA x 2, preserved LVEF 50%, subdural hematoma S/P craniotomy in 2015, diabetes, hypertension, and hyperlipidemia, presenting with chest pain since the morning of 6/26.     # chest pain: The ekg and cardiac enzymes are negative for myocardial infarction. Considering the patient's angina and longstanding history of CAD, the patient is currently undergoing a stress test to evaluate for ACS. Possible cardiac catheterization to follow if myocardium grossly ischemic.   - continue aspirin 81 mg daily    # HTN  - continue Enalapril Maleate 5 MG Oral Tablet. If needed, can put holding parameter of sbp<90. ACEs are indicated given his CAD   - please resume his home Metoprolol Succinate ER 25 MG . If needed, can put holding parameter of HR<50. Bblockers has proven mortality benefit in CAD and is a good anti-anginal medication.    # Hyperlipidemia  - continue Atorvastatin Calcium 40 MG Oral Tablet; TAKE 1 TABLET DAILY AS DIRECTED  -  continue Gemfibrozil 600 MG Oral Tablet; TAKE 1 TABLET DAILY    # diabetes  - MetFORMIN HCl - 850 MG Oral Tablet; Take 1 tablet daily    # FENGI diet    Yarelis Peck MD [id number]      	  ASSESSMENT/PLAN: 	    Yarelis Peck [place] [id number] Date of Admission: 6/27/17    24H hour events: No acute overnight events. The patient needed acetaminophen 650 mg for a headache at 1:00 a.m., which resolved the pain. He is currently undergoing nuclear pharmacologic stress test and is doing well. No chest pain, SOB, diaphoresis, nausea, or vomiting.    MEDICATIONS:  aspirin enteric coated 81 milliGRAM(s) Oral daily  enalapril 10 milliGRAM(s) Oral daily  hydrochlorothiazide 25 milliGRAM(s) Oral daily  metoprolol succinate ER 25 milliGRAM(s) Oral daily  acetaminophen   Tablet. 650 milliGRAM(s) Oral every 6 hours PRN  insulin lispro (HumaLOG) corrective regimen sliding scale   SubCutaneous three times a day before meals  insulin lispro (HumaLOG) corrective regimen sliding scale   SubCutaneous at bedtime  dextrose Gel 1 Dose(s) Oral once PRN  dextrose 50% Injectable 12.5 Gram(s) IV Push once  dextrose 50% Injectable 25 Gram(s) IV Push once  dextrose 50% Injectable 25 Gram(s) IV Push once  glucagon  Injectable 1 milliGRAM(s) IntraMuscular once PRN  atorvastatin 40 milliGRAM(s) Oral at bedtime  gemfibrozil 600 milliGRAM(s) Oral daily        REVIEW OF SYSTEMS:  Complete 10point ROS negative.    PHYSICAL EXAM:  T(C): 36.4 (06-29-17 @ 06:07), Max: 36.7 (06-28-17 @ 17:12)  HR: 72 (06-29-17 @ 06:07) (72 - 79)  BP: 120/76 (06-29-17 @ 06:07) (120/76 - 129/95)  RR: 17 (06-29-17 @ 06:07) (16 - 18)  SpO2: 98% (06-29-17 @ 06:07) (98% - 100%)  Wt(kg): --  I&O's Summary      Appearance: Normal	  HEENT:   Normal oral mucosa, PERRL, EOMI	  Lymphatic: No lymphadenopathy  Cardiovascular: Normal S1 S2, No JVD, No murmurs, No edema  Respiratory: Lungs clear to auscultation	  Psychiatry: A & O x 3, Mood & affect appropriate  Gastrointestinal:  Soft, Non-tender, + BS	  Skin: No rashes, No ecchymoses, No cyanosis	  Neurologic: Non-focal  Extremities: Normal range of motion, No clubbing, cyanosis or edema  Vascular: Peripheral pulses palpable 2+ bilaterally        LABS:	 	    CBC Full  -  ( 29 Jun 2017 06:30 )  WBC Count : 5.92 K/uL  Hemoglobin : 12.6 g/dL  Hematocrit : 37.9 %  Platelet Count - Automated : 243 K/uL  Mean Cell Volume : 81.3 fL  Mean Cell Hemoglobin : 27.0 pg  Mean Cell Hemoglobin Concentration : 33.2 %  Auto Neutrophil # : x  Auto Lymphocyte # : x  Auto Monocyte # : x  Auto Eosinophil # : x  Auto Basophil # : x  Auto Neutrophil % : x  Auto Lymphocyte % : x  Auto Monocyte % : x  Auto Eosinophil % : x  Auto Basophil % : x    06-29    141  |  99  |  17  ----------------------------<  136<H>  3.7   |  27  |  0.98  06-28    141  |  99  |  17  ----------------------------<  121<H>  3.7   |  28  |  0.90    Ca    9.8      29 Jun 2017 06:30  Ca    9.8      28 Jun 2017 06:30    TPro  7.6  /  Alb  4.5  /  TBili  0.6  /  DBili  x   /  AST  32  /  ALT  29  /  AlkPhos  92  06-27      HgA1c: 7.7 (6-27-17 19:20)      CARDIAC MARKERS:  <0.06 6-27-17 19:20  <0.06 6-27-17 12:08        ECG: normal sinus rhythm, no ST elevations or q waves  	  RADIOLOGY:    EXAM:  RAD CHEST PA LAT        PROCEDURE DATE:  Jun 27 2017     INTERPRETATION:  CLINICAL INDICATION: chest pain    EXAM:  Upright frontal and lateral chest from 6/27/2017 at 1353. Compared to   prior study from 1/19/2016.    IMPRESSION:  Clear lungs. No pleural effusions or pneumothorax.    Left coronary stent again noted. Otherwise cardiac and mediastinal   silhouettes within normal limits.    Trachea midline.    Unremarkable osseous structures.      PREVIOUS DIAGNOSTIC TESTING:    [ ] Echocardiogram:  Echo: 2/16/17, mild MR, normal LA, diastolic dysfunction, LVEF 50%; 1/14/16, normal LA, preserved LV function, LVEF 50%   [ ]  Catheterization:  9/9/13, oLAD 30%, pLAD 50%, mLAD 90% ISR, mCx 10% ISR, patent dCx stent, pOM3 40%, pRCA 20%, mRCA 20%, LVEF 65%   Stent: 9/9/13, XPEDITION stents to pLAD 50% and mLAD 90% ISR   [ ] Stress Test:  	  Stress Test: 1/21/16 (regadenoson MIBI), no evidence of infarction or inducible ischemia, LVEF 67%       ASSESSMENT/PLAN: 	    The patient is a 56 year old male with a history of hypertension, CAD with DANTE in LAD x 3, circumflex x2, OM3, and RCA x 2, preserved LVEF 50%, subdural hematoma S/P craniotomy in 2015, diabetes, hypertension, and hyperlipidemia, presenting with chest pain since the morning of 6/26.     # chest pain: The ekg and cardiac enzymes are negative for myocardial infarction. Considering the patient's angina and longstanding history of CAD, the patient is currently undergoing a stress test to evaluate for ACS. Possible cardiac catheterization to follow if myocardium grossly ischemic.   - continue aspirin 81 mg daily    # HTN  - continue Enalapril Maleate 5 MG Oral Tablet. If needed, can put holding parameter of sbp<90. ACEs are indicated given his CAD   - please resume his home Metoprolol Succinate ER 25 MG . If needed, can put holding parameter of HR<50. Bblockers has proven mortality benefit in CAD and is a good anti-anginal medication.    # Hyperlipidemia  - continue Atorvastatin Calcium 40 MG Oral Tablet; TAKE 1 TABLET DAILY AS DIRECTED  -  continue Gemfibrozil 600 MG Oral Tablet; TAKE 1 TABLET DAILY    # diabetes  - Continue lispro    # FENGI diet    Yarelis Peck [place] [id number]      	  ASSESSMENT/PLAN: 	    Yarelis Peck [place] [id number]

## 2017-06-30 VITALS
SYSTOLIC BLOOD PRESSURE: 116 MMHG | HEART RATE: 80 BPM | TEMPERATURE: 98 F | RESPIRATION RATE: 18 BRPM | DIASTOLIC BLOOD PRESSURE: 86 MMHG | OXYGEN SATURATION: 99 %

## 2017-06-30 LAB
BUN SERPL-MCNC: 20 MG/DL — SIGNIFICANT CHANGE UP (ref 7–23)
CALCIUM SERPL-MCNC: 9.7 MG/DL — SIGNIFICANT CHANGE UP (ref 8.4–10.5)
CHLORIDE SERPL-SCNC: 98 MMOL/L — SIGNIFICANT CHANGE UP (ref 98–107)
CO2 SERPL-SCNC: 23 MMOL/L — SIGNIFICANT CHANGE UP (ref 22–31)
CREAT SERPL-MCNC: 1.07 MG/DL — SIGNIFICANT CHANGE UP (ref 0.5–1.3)
GLUCOSE SERPL-MCNC: 143 MG/DL — HIGH (ref 70–99)
HCT VFR BLD CALC: 38.9 % — LOW (ref 39–50)
HGB BLD-MCNC: 12.8 G/DL — LOW (ref 13–17)
MAGNESIUM SERPL-MCNC: 1.8 MG/DL — SIGNIFICANT CHANGE UP (ref 1.6–2.6)
MCHC RBC-ENTMCNC: 26.8 PG — LOW (ref 27–34)
MCHC RBC-ENTMCNC: 32.9 % — SIGNIFICANT CHANGE UP (ref 32–36)
MCV RBC AUTO: 81.6 FL — SIGNIFICANT CHANGE UP (ref 80–100)
NRBC # FLD: 0 — SIGNIFICANT CHANGE UP
PHOSPHATE SERPL-MCNC: 3.2 MG/DL — SIGNIFICANT CHANGE UP (ref 2.5–4.5)
PLATELET # BLD AUTO: 262 K/UL — SIGNIFICANT CHANGE UP (ref 150–400)
PMV BLD: 11 FL — SIGNIFICANT CHANGE UP (ref 7–13)
POTASSIUM SERPL-MCNC: 3.4 MMOL/L — LOW (ref 3.5–5.3)
POTASSIUM SERPL-SCNC: 3.4 MMOL/L — LOW (ref 3.5–5.3)
RBC # BLD: 4.77 M/UL — SIGNIFICANT CHANGE UP (ref 4.2–5.8)
RBC # FLD: 13.2 % — SIGNIFICANT CHANGE UP (ref 10.3–14.5)
SODIUM SERPL-SCNC: 138 MMOL/L — SIGNIFICANT CHANGE UP (ref 135–145)
WBC # BLD: 6.02 K/UL — SIGNIFICANT CHANGE UP (ref 3.8–10.5)
WBC # FLD AUTO: 6.02 K/UL — SIGNIFICANT CHANGE UP (ref 3.8–10.5)

## 2017-06-30 PROCEDURE — 99238 HOSP IP/OBS DSCHRG MGMT 30/<: CPT

## 2017-06-30 RX ORDER — ASPIRIN/CALCIUM CARB/MAGNESIUM 324 MG
1 TABLET ORAL
Qty: 0 | Refills: 0 | DISCHARGE
Start: 2017-06-30

## 2017-06-30 RX ORDER — POTASSIUM CHLORIDE 20 MEQ
40 PACKET (EA) ORAL ONCE
Qty: 0 | Refills: 0 | Status: DISCONTINUED | OUTPATIENT
Start: 2017-06-30 | End: 2017-06-30

## 2017-06-30 RX ORDER — ATORVASTATIN CALCIUM 80 MG/1
1 TABLET, FILM COATED ORAL
Qty: 0 | Refills: 0 | DISCHARGE
Start: 2017-06-30

## 2017-06-30 RX ORDER — METOPROLOL TARTRATE 50 MG
1 TABLET ORAL
Qty: 0 | Refills: 0 | DISCHARGE
Start: 2017-06-30

## 2017-06-30 RX ORDER — ACETAMINOPHEN 500 MG
2 TABLET ORAL
Qty: 0 | Refills: 0 | DISCHARGE
Start: 2017-06-30 | End: 2017-07-07

## 2017-06-30 RX ORDER — GEMFIBROZIL 600 MG
1 TABLET ORAL
Qty: 0 | Refills: 0 | DISCHARGE
Start: 2017-06-30

## 2017-06-30 RX ADMIN — Medication 25 MILLIGRAM(S): at 05:53

## 2017-06-30 RX ADMIN — Medication 600 MILLIGRAM(S): at 12:55

## 2017-06-30 RX ADMIN — Medication: at 12:55

## 2017-06-30 RX ADMIN — Medication 81 MILLIGRAM(S): at 12:55

## 2017-06-30 RX ADMIN — Medication 10 MILLIGRAM(S): at 05:53

## 2017-06-30 NOTE — DISCHARGE NOTE ADULT - SECONDARY DIAGNOSIS.
Type 2 diabetes mellitus without complication, without long-term current use of insulin Essential hypertension Coronary artery disease involving native coronary artery of native heart without angina pectoris Hypercholesteremia

## 2017-06-30 NOTE — DISCHARGE NOTE ADULT - PLAN OF CARE
to prevent future episodes follow up with cardiology-- 1-2 week, PCP -- 1 week;   take medications as prescribed follow up with PCP -- 1 week;   take medications as prescribed appropriate diabetic control good BP control continue Lipitor to prevent future episodes of angina pectoris

## 2017-06-30 NOTE — CHART NOTE - NSCHARTNOTEFT_GEN_A_CORE
NUC report returned    GATED ANALYSIS:  Post-stress gated wall motion analysis was performed (LVEF  = 66 %;LVEDV = 47 ml.), revealing normal  overall LV  function.There was paradoxical septal motion, with milldy  diminished systolic thickening. RV function appeared  normal.  ------------------------------------------------------------------------  IMPRESSIONS:Normal Study  * Myocardial Perfusion SPECT results are normal.  * There is a small, mild to moderate defect in basal  inferior and basal septal wall that was reversible,  suggestive of ischemia  * Post-stress gated wall motion analysis was performed  (LVEF = 66 %;LVEDV = 47 ml.), revealing normal  overall LV  function.There was paradoxical septal motion, with milldy  diminished systolic thickening. RV function appeared  normal.    Reviewed nuc imaging w/ Dr Olivares, the small basal infero wall defect likely 2/2 increased gut uptake. Suspect artifact,. Normal LV Function. Stable for discharge on current outpt meds of asa 81, enalapril 10, gemfibrozil 600mg qd, hctzd 25, metop succ 25, ator 40, metformin 850 qd

## 2017-06-30 NOTE — DISCHARGE NOTE ADULT - PATIENT PORTAL LINK FT
“You can access the [employer] Patient Portal, offered by [facility], by registering with the following website: [email or web address]”

## 2017-06-30 NOTE — DISCHARGE NOTE ADULT - CARE PROVIDER_API CALL
Dr Quinn-- PCP,   Phone: (   )    -  Fax: (   )    -    Claude Martinez (MD), Cardiovascular Disease; Internal Medicine; Interventional Cardiology  [address] [place] [address]  Phone: [phone]  Fax: [phone]

## 2017-06-30 NOTE — PROGRESS NOTE ADULT - SUBJECTIVE AND OBJECTIVE BOX
24H hour events: no events, had stres images today, rest images this AM. no chest pain feels well    tele: sinus 50s    MEDICATIONS:  aspirin enteric coated 81 milliGRAM(s) Oral daily  enalapril 10 milliGRAM(s) Oral daily  hydrochlorothiazide 25 milliGRAM(s) Oral daily  metoprolol succinate ER 25 milliGRAM(s) Oral daily        acetaminophen   Tablet. 650 milliGRAM(s) Oral every 6 hours PRN      insulin lispro (HumaLOG) corrective regimen sliding scale   SubCutaneous three times a day before meals  insulin lispro (HumaLOG) corrective regimen sliding scale   SubCutaneous at bedtime  dextrose Gel 1 Dose(s) Oral once PRN  dextrose 50% Injectable 12.5 Gram(s) IV Push once  dextrose 50% Injectable 25 Gram(s) IV Push once  dextrose 50% Injectable 25 Gram(s) IV Push once  glucagon  Injectable 1 milliGRAM(s) IntraMuscular once PRN  atorvastatin 40 milliGRAM(s) Oral at bedtime  gemfibrozil 600 milliGRAM(s) Oral daily        REVIEW OF SYSTEMS:  Complete 10point ROS negative.    PHYSICAL EXAM:  T(C): 36.3 (06-30-17 @ 08:55), Max: 36.8 (06-29-17 @ 21:43)  HR: 743 (06-30-17 @ 08:55) (85 - 743)  BP: 115/74 (06-30-17 @ 08:55) (113/76 - 132/86)  RR: 18 (06-30-17 @ 08:55) (18 - 18)  SpO2: 98% (06-30-17 @ 08:55) (98% - 100%)  Wt(kg): --  I&O's Summary      Appearance: Normal	  HEENT:   Normal oral mucosa, PERRL, EOMI	  Lymphatic: No lymphadenopathy  Cardiovascular: Normal S1 S2, No JVD, No murmurs, No edema  Respiratory: Lungs clear to auscultation	  Psychiatry: A & O x 3, Mood & affect appropriate  Gastrointestinal:  Soft, Non-tender, + BS	  Skin: No rashes, No ecchymoses, No cyanosis	  Neurologic: Non-focal  Extremities: Normal range of motion, No clubbing, cyanosis or edema  Vascular: Peripheral pulses palpable 2+ bilaterally        LABS:	 	    CBC Full  -  ( 30 Jun 2017 07:20 )  WBC Count : 6.02 K/uL  Hemoglobin : 12.8 g/dL  Hematocrit : 38.9 %  Platelet Count - Automated : 262 K/uL  Mean Cell Volume : 81.6 fL  Mean Cell Hemoglobin : 26.8 pg  Mean Cell Hemoglobin Concentration : 32.9 %  Auto Neutrophil # : x  Auto Lymphocyte # : x  Auto Monocyte # : x  Auto Eosinophil # : x  Auto Basophil # : x  Auto Neutrophil % : x  Auto Lymphocyte % : x  Auto Monocyte % : x  Auto Eosinophil % : x  Auto Basophil % : x    06-30    138  |  98  |  20  ----------------------------<  143<H>  3.4<L>   |  23  |  1.07  06-29    141  |  99  |  17  ----------------------------<  136<H>  3.7   |  27  |  0.98    Ca    9.7      30 Jun 2017 07:20  Ca    9.8      29 Jun 2017 06:30  Phos  3.2     06-30  Mg     1.8     06-30        Echo: 2/16/17, mild MR, normal LA, diastolic dysfunction, LVEF 50%; 1/14/16, normal LA, preserved LV function, LVEF 50%   [ ]  Catheterization:  9/9/13, oLAD 30%, pLAD 50%, mLAD 90% ISR, mCx 10% ISR, patent dCx stent, pOM3 40%, pRCA 20%, mRCA 20%, LVEF 65%   Stent: 9/9/13, XPEDITION stents to pLAD 50% and mLAD 90% ISR   [ ] Stress Test:  	  Stress Test: 1/21/16 (regadenoson MIBI), no evidence of infarction or inducible ischemia, LVEF 67%       ASSESSMENT/PLAN: 	    The patient is a 56 year old male with a history of hypertension, CAD with DANTE in LAD x 3, circumflex x2, OM3, and RCA x 2, preserved LVEF 50%, subdural hematoma S/P craniotomy in 2015, diabetes, hypertension, and hyperlipidemia, presenting with chest pain since the morning of 6/26.     # chest pain: The ekg and cardiac enzymes are negative for myocardial infarction. Considering the patient's angina and longstanding history of CAD, the patient is currently undergoing a stress test to evaluate for ACS.   - FU stress test results, if negative, dispo,   - continue aspirin 81 mg daily  -cont metop 25 succinate    # HTN  - cont elalapril 10  - cont hctzd 25      # Hyperlipidemia  - continue Atorvastatin Calcium 40 MG Oral Tablet; TAKE 1 TABLET DAILY AS DIRECTED  -  continue Gemfibrozil 600 MG Oral Tablet; TAKE 1 TABLET DAILY    # diabetes  - Continue lispro    # FENGI diet  #dispo pending nuc stress study today. if negative, discharge     [place]arelis Peck [place] [id number] 24H hour events: no events, had stres images today, rest images this AM. no chest pain feels well    tele: sinus 50s    MEDICATIONS:  aspirin enteric coated 81 milliGRAM(s) Oral daily  enalapril 10 milliGRAM(s) Oral daily  hydrochlorothiazide 25 milliGRAM(s) Oral daily  metoprolol succinate ER 25 milliGRAM(s) Oral daily        acetaminophen   Tablet. 650 milliGRAM(s) Oral every 6 hours PRN      insulin lispro (HumaLOG) corrective regimen sliding scale   SubCutaneous three times a day before meals  insulin lispro (HumaLOG) corrective regimen sliding scale   SubCutaneous at bedtime  dextrose Gel 1 Dose(s) Oral once PRN  dextrose 50% Injectable 12.5 Gram(s) IV Push once  dextrose 50% Injectable 25 Gram(s) IV Push once  dextrose 50% Injectable 25 Gram(s) IV Push once  glucagon  Injectable 1 milliGRAM(s) IntraMuscular once PRN  atorvastatin 40 milliGRAM(s) Oral at bedtime  gemfibrozil 600 milliGRAM(s) Oral daily        REVIEW OF SYSTEMS:  Complete 10point ROS negative.    PHYSICAL EXAM:  T(C): 36.3 (06-30-17 @ 08:55), Max: 36.8 (06-29-17 @ 21:43)  HR: 743 (06-30-17 @ 08:55) (85 - 743)  BP: 115/74 (06-30-17 @ 08:55) (113/76 - 132/86)  RR: 18 (06-30-17 @ 08:55) (18 - 18)  SpO2: 98% (06-30-17 @ 08:55) (98% - 100%)  Wt(kg): --  I&O's Summary      Appearance: Normal	  HEENT:   Normal oral mucosa, PERRL, EOMI	  Lymphatic: No lymphadenopathy  Cardiovascular: Normal S1 S2, No JVD, No murmurs, No edema  Respiratory: Lungs clear to auscultation	  Psychiatry: A & O x 3, Mood & affect appropriate  Gastrointestinal:  Soft, Non-tender, + BS	  Skin: No rashes, No ecchymoses, No cyanosis	  Neurologic: Non-focal  Extremities: Normal range of motion, No clubbing, cyanosis or edema  Vascular: Peripheral pulses palpable 2+ bilaterally        LABS:	 	    CBC Full  -  ( 30 Jun 2017 07:20 )  WBC Count : 6.02 K/uL  Hemoglobin : 12.8 g/dL  Hematocrit : 38.9 %  Platelet Count - Automated : 262 K/uL  Mean Cell Volume : 81.6 fL  Mean Cell Hemoglobin : 26.8 pg  Mean Cell Hemoglobin Concentration : 32.9 %  Auto Neutrophil # : x  Auto Lymphocyte # : x  Auto Monocyte # : x  Auto Eosinophil # : x  Auto Basophil # : x  Auto Neutrophil % : x  Auto Lymphocyte % : x  Auto Monocyte % : x  Auto Eosinophil % : x  Auto Basophil % : x    06-30    138  |  98  |  20  ----------------------------<  143<H>  3.4<L>   |  23  |  1.07  06-29    141  |  99  |  17  ----------------------------<  136<H>  3.7   |  27  |  0.98    Ca    9.7      30 Jun 2017 07:20  Ca    9.8      29 Jun 2017 06:30  Phos  3.2     06-30  Mg     1.8     06-30        Echo: 2/16/17, mild MR, normal LA, diastolic dysfunction, LVEF 50%; 1/14/16, normal LA, preserved LV function, LVEF 50%   [ ]  Catheterization:  9/9/13, oLAD 30%, pLAD 50%, mLAD 90% ISR, mCx 10% ISR, patent dCx stent, pOM3 40%, pRCA 20%, mRCA 20%, LVEF 65%   Stent: 9/9/13, XPEDITION stents to pLAD 50% and mLAD 90% ISR   [ ] Stress Test:  	  Stress Test: 1/21/16 (regadenoson MIBI), no evidence of infarction or inducible ischemia, LVEF 67%       ASSESSMENT/PLAN: 	    The patient is a 56 year old male with a history of hypertension, CAD with DANTE in LAD x 3, circumflex x2, OM3, and RCA x 2, preserved LVEF 50%, subdural hematoma S/P craniotomy in 2015, diabetes, hypertension, and hyperlipidemia, presenting with chest pain since the morning of 6/26.     # chest pain: The ekg and cardiac enzymes are negative for myocardial infarction. Considering the patient's angina and longstanding history of CAD, the patient is currently undergoing a stress test to evaluate for ACS.   - FU stress test results, if negative, dispo,   - continue aspirin 81 mg daily  -cont metop 25 succinate    # HTN  - cont elalapril 10  - cont hctzd 25  - replete k>4 mg >2    # Hyperlipidemia  - continue Atorvastatin Calcium 40 MG Oral Tablet; TAKE 1 TABLET DAILY AS DIRECTED  -  continue Gemfibrozil 600 MG Oral Tablet; TAKE 1 TABLET DAILY    # diabetes  - Continue lispro    # FENGI diet  #dispo pending nuc stress study today. if negative, discharge     Yarelis Peck [place] [id number]

## 2017-06-30 NOTE — DISCHARGE NOTE ADULT - CARE PLAN
Principal Discharge DX:	Other chest pain  Goal:	to prevent future episodes  Instructions for follow-up, activity and diet:	follow up with cardiology-- 1-2 week, PCP -- 1 week;   take medications as prescribed  Secondary Diagnosis:	Type 2 diabetes mellitus without complication, without long-term current use of insulin  Goal:	appropriate diabetic control  Instructions for follow-up, activity and diet:	follow up with PCP -- 1 week;   take medications as prescribed  Secondary Diagnosis:	Essential hypertension  Goal:	good BP control  Instructions for follow-up, activity and diet:	follow up with PCP -- 1 week;   take medications as prescribed  Secondary Diagnosis:	Coronary artery disease involving native coronary artery of native heart without angina pectoris  Instructions for follow-up, activity and diet:	follow up with cardiology-- 1-2 week, PCP -- 1 week;   take medications as prescribed  Secondary Diagnosis:	Hypercholesteremia  Instructions for follow-up, activity and diet:	continue Lipitor Principal Discharge DX:	Angina pectoris  Goal:	to prevent future episodes  Instructions for follow-up, activity and diet:	follow up with cardiology-- 1-2 week, PCP -- 1 week;   take medications as prescribed  Secondary Diagnosis:	Type 2 diabetes mellitus without complication, without long-term current use of insulin  Goal:	appropriate diabetic control  Instructions for follow-up, activity and diet:	follow up with PCP -- 1 week;   take medications as prescribed  Secondary Diagnosis:	Essential hypertension  Goal:	good BP control  Instructions for follow-up, activity and diet:	follow up with PCP -- 1 week;   take medications as prescribed  Secondary Diagnosis:	Coronary artery disease involving native coronary artery of native heart without angina pectoris  Goal:	to prevent future episodes of angina pectoris  Instructions for follow-up, activity and diet:	follow up with cardiology-- 1-2 week, PCP -- 1 week;   take medications as prescribed  Secondary Diagnosis:	Hypercholesteremia  Instructions for follow-up, activity and diet:	continue Lipitor

## 2017-06-30 NOTE — DISCHARGE NOTE ADULT - OTHER SIGNIFICANT FINDINGS
EKG: NSR @ 77 bpm with Poor R-wave progression in V1-V6  CXR: neg  Diego x2:Neg  HgbA1C-- 7.7,   Nuclear stress test -- EKG: NSR @ 77 bpm with Poor R-wave progression in V1-V6  CXR: neg  Diego x2:Neg  HgbA1C-- 7.7,     Cardiology: The ekg and cardiac enzymes are negative for myocardial infarction. --> continue aspirin 81 mg daily  -cont metop 25 succinate    # HTN  - cont elalapril 10  - cont hctzd 25    # Hyperlipidemia  - continue Atorvastatin Calcium 40 MG Oral Tablet; TAKE 1 TABLET DAILY AS DIRECTED  -  continue Gemfibrozil 600 MG Oral Tablet; TAKE 1 TABLET DAILY    Nuclear stress test -- *** EKG: NSR @ 77 bpm with Poor R-wave progression in V1-V6  CXR: neg  Diego x2:Neg  HgbA1C-- 7.7,     Cardiology: The ekg and cardiac enzymes are negative for myocardial infarction. --> continue aspirin 81 mg daily  -cont metop 25 succinate    # HTN  - cont elalapril 10  - cont hctzd 25    # Hyperlipidemia  - continue Atorvastatin Calcium 40 MG Oral Tablet; TAKE 1 TABLET DAILY AS DIRECTED  -  continue Gemfibrozil 600 MG Oral Tablet; TAKE 1 TABLET DAILY    Nuclear stress test -- Post-stress gated wall motion analysis was performed (LVEF = 66 %;LVEDV = 47 ml.), revealing normal  overall LV function.There was paradoxical septal motion, with mildly diminished systolic thickening. RV function appeared normal.  ------------------------------------------------------------------------

## 2017-06-30 NOTE — DISCHARGE NOTE ADULT - MEDICATION SUMMARY - MEDICATIONS TO TAKE
I will START or STAY ON the medications listed below when I get home from the hospital:    acetaminophen 325 mg oral tablet  -- 2 tab(s) by mouth every 6 hours, As needed, Mild, moderate, or sever pain,  -- Indication: For pain as needed     aspirin 81 mg oral delayed release tablet  -- 1 tab(s) by mouth once a day  -- Indication: For Coronary artery disease     enalapril 10 mg oral tablet  -- 1 tab(s) by mouth once a day  -- Indication: For Hypertension    metFORMIN 850 mg oral tablet  -- 1 tab(s) by mouth once a day (in the morning)  -- Indication: For Diabetes mellitus    atorvastatin 40 mg oral tablet  -- 1 tab(s) by mouth once a day (at bedtime)  -- Indication: For Hypercholesteremia    gemfibrozil 600 mg oral tablet  -- 1 tab(s) by mouth once a day  -- Indication: For Diabetes mellitus    metoprolol succinate 25 mg oral tablet, extended release  -- 1 tab(s) by mouth once a day  -- Indication: For Hypertension     hydroCHLOROthiazide 25 mg oral tablet  -- 1 tab(s) by mouth once a day  -- Indication: For Hypertension

## 2017-07-01 PROCEDURE — G9001: CPT

## 2017-07-20 ENCOUNTER — NON-APPOINTMENT (OUTPATIENT)
Age: 57
End: 2017-07-20

## 2017-07-20 ENCOUNTER — APPOINTMENT (OUTPATIENT)
Dept: CARDIOLOGY | Facility: CLINIC | Age: 57
End: 2017-07-20

## 2017-07-20 VITALS
DIASTOLIC BLOOD PRESSURE: 78 MMHG | RESPIRATION RATE: 14 BRPM | HEART RATE: 60 BPM | OXYGEN SATURATION: 96 % | SYSTOLIC BLOOD PRESSURE: 124 MMHG

## 2017-10-27 ENCOUNTER — APPOINTMENT (OUTPATIENT)
Dept: PAIN MANAGEMENT | Facility: CLINIC | Age: 57
End: 2017-10-27
Payer: MEDICAID

## 2017-10-27 VITALS
HEART RATE: 78 BPM | HEIGHT: 62 IN | BODY MASS INDEX: 28.89 KG/M2 | DIASTOLIC BLOOD PRESSURE: 80 MMHG | SYSTOLIC BLOOD PRESSURE: 119 MMHG | WEIGHT: 157 LBS

## 2017-10-27 PROCEDURE — 99204 OFFICE O/P NEW MOD 45 MIN: CPT

## 2017-10-27 RX ORDER — RANOLAZINE 500 MG/1
500 TABLET, FILM COATED, EXTENDED RELEASE ORAL
Refills: 0 | Status: ACTIVE | COMMUNITY

## 2017-12-05 ENCOUNTER — OUTPATIENT (OUTPATIENT)
Dept: OUTPATIENT SERVICES | Facility: HOSPITAL | Age: 57
LOS: 1 days | End: 2017-12-05
Payer: MEDICAID

## 2017-12-05 ENCOUNTER — APPOINTMENT (OUTPATIENT)
Dept: MRI IMAGING | Facility: CLINIC | Age: 57
End: 2017-12-05
Payer: MEDICAID

## 2017-12-05 DIAGNOSIS — Z95.5 PRESENCE OF CORONARY ANGIOPLASTY IMPLANT AND GRAFT: Chronic | ICD-10-CM

## 2017-12-05 DIAGNOSIS — I61.9 NONTRAUMATIC INTRACEREBRAL HEMORRHAGE, UNSPECIFIED: Chronic | ICD-10-CM

## 2017-12-05 DIAGNOSIS — R51 HEADACHE: ICD-10-CM

## 2017-12-05 PROCEDURE — 82565 ASSAY OF CREATININE: CPT

## 2017-12-05 PROCEDURE — 70553 MRI BRAIN STEM W/O & W/DYE: CPT

## 2017-12-05 PROCEDURE — 70553 MRI BRAIN STEM W/O & W/DYE: CPT | Mod: 26

## 2017-12-07 ENCOUNTER — NON-APPOINTMENT (OUTPATIENT)
Age: 57
End: 2017-12-07

## 2017-12-07 ENCOUNTER — APPOINTMENT (OUTPATIENT)
Dept: CARDIOLOGY | Facility: CLINIC | Age: 57
End: 2017-12-07
Payer: MEDICAID

## 2017-12-07 VITALS
WEIGHT: 157 LBS | BODY MASS INDEX: 28.89 KG/M2 | SYSTOLIC BLOOD PRESSURE: 124 MMHG | RESPIRATION RATE: 14 BRPM | HEIGHT: 62 IN | DIASTOLIC BLOOD PRESSURE: 74 MMHG | OXYGEN SATURATION: 98 % | HEART RATE: 58 BPM

## 2017-12-07 PROCEDURE — 99214 OFFICE O/P EST MOD 30 MIN: CPT

## 2017-12-07 PROCEDURE — 93000 ELECTROCARDIOGRAM COMPLETE: CPT

## 2017-12-08 ENCOUNTER — APPOINTMENT (OUTPATIENT)
Dept: NEUROSURGERY | Facility: CLINIC | Age: 57
End: 2017-12-08
Payer: MEDICAID

## 2017-12-08 VITALS
HEIGHT: 62 IN | BODY MASS INDEX: 28.71 KG/M2 | HEART RATE: 69 BPM | WEIGHT: 156 LBS | DIASTOLIC BLOOD PRESSURE: 76 MMHG | OXYGEN SATURATION: 97 % | TEMPERATURE: 98.5 F | SYSTOLIC BLOOD PRESSURE: 119 MMHG

## 2017-12-08 PROCEDURE — 99214 OFFICE O/P EST MOD 30 MIN: CPT

## 2017-12-08 RX ORDER — TOPIRAMATE 25 MG/1
25 TABLET, FILM COATED ORAL
Qty: 120 | Refills: 1 | Status: DISCONTINUED | COMMUNITY
Start: 2017-10-27 | End: 2017-12-08

## 2017-12-08 RX ORDER — GABAPENTIN 400 MG/1
400 CAPSULE ORAL
Refills: 0 | Status: DISCONTINUED | COMMUNITY
End: 2017-12-08

## 2017-12-12 ENCOUNTER — APPOINTMENT (OUTPATIENT)
Dept: PAIN MANAGEMENT | Facility: CLINIC | Age: 57
End: 2017-12-12
Payer: MEDICAID

## 2017-12-12 VITALS
BODY MASS INDEX: 28.89 KG/M2 | WEIGHT: 157 LBS | HEART RATE: 55 BPM | DIASTOLIC BLOOD PRESSURE: 75 MMHG | HEIGHT: 62 IN | SYSTOLIC BLOOD PRESSURE: 119 MMHG

## 2017-12-12 PROCEDURE — 99213 OFFICE O/P EST LOW 20 MIN: CPT

## 2018-01-23 ENCOUNTER — APPOINTMENT (OUTPATIENT)
Dept: PAIN MANAGEMENT | Facility: CLINIC | Age: 58
End: 2018-01-23
Payer: MEDICAID

## 2018-01-23 VITALS
HEIGHT: 62 IN | BODY MASS INDEX: 28.89 KG/M2 | DIASTOLIC BLOOD PRESSURE: 73 MMHG | WEIGHT: 157 LBS | SYSTOLIC BLOOD PRESSURE: 119 MMHG | HEART RATE: 69 BPM

## 2018-01-23 DIAGNOSIS — I62.03 NONTRAUMATIC CHRONIC SUBDURAL HEMORRHAGE: ICD-10-CM

## 2018-01-23 PROCEDURE — 99213 OFFICE O/P EST LOW 20 MIN: CPT

## 2018-03-08 ENCOUNTER — NON-APPOINTMENT (OUTPATIENT)
Age: 58
End: 2018-03-08

## 2018-03-08 ENCOUNTER — APPOINTMENT (OUTPATIENT)
Dept: CARDIOLOGY | Facility: CLINIC | Age: 58
End: 2018-03-08
Payer: MEDICAID

## 2018-03-08 VITALS
BODY MASS INDEX: 28.89 KG/M2 | WEIGHT: 157 LBS | HEART RATE: 81 BPM | RESPIRATION RATE: 14 BRPM | HEIGHT: 62 IN | DIASTOLIC BLOOD PRESSURE: 82 MMHG | SYSTOLIC BLOOD PRESSURE: 121 MMHG | OXYGEN SATURATION: 98 %

## 2018-03-08 PROCEDURE — 99214 OFFICE O/P EST MOD 30 MIN: CPT

## 2018-03-08 PROCEDURE — 93000 ELECTROCARDIOGRAM COMPLETE: CPT

## 2018-03-28 ENCOUNTER — APPOINTMENT (OUTPATIENT)
Dept: OTOLARYNGOLOGY | Facility: CLINIC | Age: 58
End: 2018-03-28
Payer: MEDICAID

## 2018-03-28 VITALS
DIASTOLIC BLOOD PRESSURE: 72 MMHG | HEART RATE: 67 BPM | SYSTOLIC BLOOD PRESSURE: 112 MMHG | WEIGHT: 157 LBS | HEIGHT: 62 IN | BODY MASS INDEX: 28.89 KG/M2

## 2018-03-28 DIAGNOSIS — H61.22 IMPACTED CERUMEN, LEFT EAR: ICD-10-CM

## 2018-03-28 DIAGNOSIS — H90.3 SENSORINEURAL HEARING LOSS, BILATERAL: ICD-10-CM

## 2018-03-28 DIAGNOSIS — H93.8X2 OTHER SPECIFIED DISORDERS OF LEFT EAR: ICD-10-CM

## 2018-03-28 PROCEDURE — 92567 TYMPANOMETRY: CPT

## 2018-03-28 PROCEDURE — 92557 COMPREHENSIVE HEARING TEST: CPT

## 2018-03-28 PROCEDURE — G0268 REMOVAL OF IMPACTED WAX MD: CPT

## 2018-03-28 PROCEDURE — 99214 OFFICE O/P EST MOD 30 MIN: CPT | Mod: 25

## 2018-08-09 ENCOUNTER — APPOINTMENT (OUTPATIENT)
Dept: CARDIOLOGY | Facility: CLINIC | Age: 58
End: 2018-08-09
Payer: MEDICAID

## 2018-08-09 ENCOUNTER — NON-APPOINTMENT (OUTPATIENT)
Age: 58
End: 2018-08-09

## 2018-08-09 VITALS
SYSTOLIC BLOOD PRESSURE: 122 MMHG | RESPIRATION RATE: 20 BRPM | TEMPERATURE: 98 F | HEIGHT: 62 IN | WEIGHT: 162 LBS | OXYGEN SATURATION: 99 % | DIASTOLIC BLOOD PRESSURE: 78 MMHG | BODY MASS INDEX: 29.81 KG/M2 | HEART RATE: 68 BPM

## 2018-08-09 PROCEDURE — 93000 ELECTROCARDIOGRAM COMPLETE: CPT

## 2018-08-09 PROCEDURE — 99213 OFFICE O/P EST LOW 20 MIN: CPT

## 2018-08-14 ENCOUNTER — APPOINTMENT (OUTPATIENT)
Dept: PAIN MANAGEMENT | Facility: CLINIC | Age: 58
End: 2018-08-14
Payer: MEDICAID

## 2018-08-14 VITALS
HEART RATE: 73 BPM | WEIGHT: 164 LBS | SYSTOLIC BLOOD PRESSURE: 119 MMHG | HEIGHT: 62 IN | BODY MASS INDEX: 30.18 KG/M2 | DIASTOLIC BLOOD PRESSURE: 82 MMHG

## 2018-08-14 DIAGNOSIS — R51 HEADACHE: ICD-10-CM

## 2018-08-14 PROCEDURE — 99213 OFFICE O/P EST LOW 20 MIN: CPT

## 2018-08-20 PROBLEM — R51 HEADACHE: Status: ACTIVE | Noted: 2017-01-18

## 2019-01-31 ENCOUNTER — APPOINTMENT (OUTPATIENT)
Dept: CARDIOLOGY | Facility: CLINIC | Age: 59
End: 2019-01-31
Payer: MEDICAID

## 2019-01-31 ENCOUNTER — NON-APPOINTMENT (OUTPATIENT)
Age: 59
End: 2019-01-31

## 2019-01-31 VITALS
BODY MASS INDEX: 29.15 KG/M2 | WEIGHT: 158.38 LBS | DIASTOLIC BLOOD PRESSURE: 77 MMHG | OXYGEN SATURATION: 97 % | HEIGHT: 62 IN | HEART RATE: 70 BPM | SYSTOLIC BLOOD PRESSURE: 122 MMHG

## 2019-01-31 PROCEDURE — 93000 ELECTROCARDIOGRAM COMPLETE: CPT

## 2019-01-31 PROCEDURE — 99213 OFFICE O/P EST LOW 20 MIN: CPT

## 2019-01-31 RX ORDER — GABAPENTIN 400 MG/1
400 CAPSULE ORAL DAILY
Refills: 0 | Status: ACTIVE | COMMUNITY
Start: 2017-12-12

## 2019-01-31 NOTE — DISCUSSION/SUMMARY
[Coronary Artery Disease] : coronary artery disease [Hypertension] : hypertension [Stable] : stable [FreeTextEntry1] : \par Currently stable from a cardiovascular standpoint. Normotensive. Euvolemic. Stable CAD. No ischemic or CHF symptoms. Chronic headache issues. History of subdural hematoma. Continue current medications. ECG completed today and reviewed. Follow up in 6 months.

## 2019-01-31 NOTE — PHYSICAL EXAM
[General Appearance - Well Developed] : well developed [Normal Appearance] : normal appearance [Well Groomed] : well groomed [General Appearance - Well Nourished] : well nourished [No Deformities] : no deformities [General Appearance - In No Acute Distress] : no acute distress [Normal Conjunctiva] : the conjunctiva exhibited no abnormalities [Eyelids - No Xanthelasma] : the eyelids demonstrated no xanthelasmas [Normal Oral Mucosa] : normal oral mucosa [No Oral Pallor] : no oral pallor [No Oral Cyanosis] : no oral cyanosis [FreeTextEntry1] : no carotid bruits or JVD [Respiration, Rhythm And Depth] : normal respiratory rhythm and effort [Exaggerated Use Of Accessory Muscles For Inspiration] : no accessory muscle use [Auscultation Breath Sounds / Voice Sounds] : lungs were clear to auscultation bilaterally [Heart Rate And Rhythm] : heart rate and rhythm were normal [Heart Sounds] : normal S1 and S2 [Murmurs] : no murmurs present [Edema] : no peripheral edema present [Abdomen Soft] : soft [Abdomen Tenderness] : non-tender [Abnormal Walk] : normal gait [Gait - Sufficient For Exercise Testing] : the gait was sufficient for exercise testing [Cyanosis, Localized] : no localized cyanosis [] : no ischemic changes [Skin Color & Pigmentation] : normal skin color and pigmentation [Oriented To Time, Place, And Person] : oriented to person, place, and time [Affect] : the affect was normal [Mood] : the mood was normal [No Anxiety] : not feeling anxious

## 2019-01-31 NOTE — HISTORY OF PRESENT ILLNESS
[FreeTextEntry1] : Doing okay. Denies chest pain, shortness of breath or palpitations. Patient has chronic headaches.

## 2019-07-01 ENCOUNTER — TRANSCRIPTION ENCOUNTER (OUTPATIENT)
Age: 59
End: 2019-07-01

## 2019-08-01 ENCOUNTER — NON-APPOINTMENT (OUTPATIENT)
Age: 59
End: 2019-08-01

## 2019-08-01 ENCOUNTER — APPOINTMENT (OUTPATIENT)
Dept: CARDIOLOGY | Facility: CLINIC | Age: 59
End: 2019-08-01
Payer: MEDICARE

## 2019-08-01 VITALS
WEIGHT: 162 LBS | TEMPERATURE: 97.8 F | OXYGEN SATURATION: 99 % | HEIGHT: 62 IN | RESPIRATION RATE: 16 BRPM | SYSTOLIC BLOOD PRESSURE: 128 MMHG | BODY MASS INDEX: 29.81 KG/M2 | DIASTOLIC BLOOD PRESSURE: 88 MMHG | HEART RATE: 89 BPM

## 2019-08-01 PROCEDURE — 93000 ELECTROCARDIOGRAM COMPLETE: CPT

## 2019-08-01 PROCEDURE — 99213 OFFICE O/P EST LOW 20 MIN: CPT

## 2019-08-01 NOTE — PHYSICAL EXAM
[General Appearance - Well Developed] : well developed [Normal Appearance] : normal appearance [Well Groomed] : well groomed [General Appearance - Well Nourished] : well nourished [No Deformities] : no deformities [General Appearance - In No Acute Distress] : no acute distress [Normal Conjunctiva] : the conjunctiva exhibited no abnormalities [Normal Oral Mucosa] : normal oral mucosa [Eyelids - No Xanthelasma] : the eyelids demonstrated no xanthelasmas [No Oral Pallor] : no oral pallor [No Oral Cyanosis] : no oral cyanosis [FreeTextEntry1] : no carotid bruits or JVD [Respiration, Rhythm And Depth] : normal respiratory rhythm and effort [Exaggerated Use Of Accessory Muscles For Inspiration] : no accessory muscle use [Auscultation Breath Sounds / Voice Sounds] : lungs were clear to auscultation bilaterally [Heart Rate And Rhythm] : heart rate and rhythm were normal [Murmurs] : no murmurs present [Heart Sounds] : normal S1 and S2 [Abdomen Soft] : soft [Edema] : no peripheral edema present [Abdomen Tenderness] : non-tender [Abnormal Walk] : normal gait [Gait - Sufficient For Exercise Testing] : the gait was sufficient for exercise testing [Cyanosis, Localized] : no localized cyanosis [Skin Color & Pigmentation] : normal skin color and pigmentation [] : no rash [No Venous Stasis] : no venous stasis [Affect] : the affect was normal [Oriented To Time, Place, And Person] : oriented to person, place, and time [Mood] : the mood was normal [No Anxiety] : not feeling anxious

## 2019-08-01 NOTE — DISCUSSION/SUMMARY
[Coronary Artery Disease] : coronary artery disease [Hypertension] : hypertension [Stable] : stable [FreeTextEntry1] : \par Currently stable from a cardiovascular standpoint. Normotensive. Euvolemic. Stable CAD. No ischemic or CHF symptoms. Patient with chronic subdural hematoma. Daily headaches. Continue current medications. ECG completed today and reviewed. Follow up in 6 months.

## 2019-12-02 ENCOUNTER — APPOINTMENT (OUTPATIENT)
Dept: NEUROSURGERY | Facility: CLINIC | Age: 59
End: 2019-12-02

## 2019-12-23 ENCOUNTER — APPOINTMENT (OUTPATIENT)
Dept: NEUROSURGERY | Facility: CLINIC | Age: 59
End: 2019-12-23
Payer: MEDICARE

## 2019-12-23 VITALS
HEIGHT: 62 IN | DIASTOLIC BLOOD PRESSURE: 94 MMHG | HEART RATE: 78 BPM | TEMPERATURE: 98.2 F | SYSTOLIC BLOOD PRESSURE: 147 MMHG | RESPIRATION RATE: 16 BRPM | OXYGEN SATURATION: 98 %

## 2019-12-23 PROCEDURE — 99213 OFFICE O/P EST LOW 20 MIN: CPT

## 2019-12-24 NOTE — REASON FOR VISIT
[Follow-Up: _____] : a [unfilled] follow-up visit [Family Member] : family member [FreeTextEntry1] : 'I have headache evryday"

## 2019-12-24 NOTE — ASSESSMENT
[FreeTextEntry1] : Impression:\par s/p left craniotomy for evacuation of a SDH on 9/9/2015, c/o headache for evaluation.  Last MRI was 2017.\par \par \par Plan:\par MRI head w/o contrast\par Will discuss the report and do the follow up accordingly\par

## 2019-12-24 NOTE — HISTORY OF PRESENT ILLNESS
[FreeTextEntry1] : GIOVANNI GUZMÁN is a very pleasant 59 year old  male here for follow up on his headache after four years of surgery. He had undergone a craniotomy with evacuation of SDH on 9/9/2015 and re exploration of craniotomy on 9/19/15. He had multiple follow up in this office  and last  MRI of the brain w/wo contrast was  done on 12/5/2017 during the last visit . He has been having persistent headaches and was under treatment by neurologist and PCP. An MRI  was done in 2018 wIth neurologist, but no report/CD  available today.\par \par Today Mr. Levy present ambulatory, accompanied with his wife and son to follow up on his condition. He reports to have Head ache( 8-10) everyday, it comes in  random, no special timing. Pain stays an hour or two and goes away. Taking gabapentin 600 mg and Amitriptyline with some relief .He reports that medication used to work well initially ,but  not much helping now a days.  Denies  speech impairment, vision problems or seizures. Has dizziness some time while getting out from bed or getting up from sitting. He reports some swelling on left temporal region,especially in the morning,which disappears in an hour or two. Today he is here to discuss some solutions for his headache.

## 2019-12-24 NOTE — PHYSICAL EXAM
[General Appearance - Alert] : alert [General Appearance - In No Acute Distress] : in no acute distress [General Appearance - Well Nourished] : well nourished [General Appearance - Well-Appearing] : healthy appearing [Oriented To Time, Place, And Person] : oriented to person, place, and time [Impaired Insight] : insight and judgment were intact [Affect] : the affect was normal [Memory Recent] : recent memory was not impaired [Person] : oriented to person [Place] : oriented to place [Time] : oriented to time [Cranial Nerves Optic (II)] : visual acuity intact bilaterally,  pupils equal round and reactive to light [Cranial Nerves Oculomotor (III)] : extraocular motion intact [Cranial Nerves Trigeminal (V)] : facial sensation intact symmetrically [Cranial Nerves Facial (VII)] : face symmetrical [Cranial Nerves Vestibulocochlear (VIII)] : hearing was intact bilaterally [Cranial Nerves Hypoglossal (XII)] : there was no tongue deviation with protrusion [Cranial Nerves Accessory (XI - Cranial And Spinal)] : head turning and shoulder shrug symmetric [Motor Tone] : muscle tone was normal in all four extremities [Motor Strength] : muscle strength was normal in all four extremities [Balance] : balance was intact [Sclera] : the sclera and conjunctiva were normal [PERRL With Normal Accommodation] : pupils were equal in size, round, reactive to light, with normal accommodation [Full Visual Field] : full visual field [Outer Ear] : the ears and nose were normal in appearance [Hearing Threshold Finger Rub Not [place]] : hearing was normal [Neck Appearance] : the appearance of the neck was normal [Neck Cervical Mass (___cm)] : no neck mass was observed [Exaggerated Use Of Accessory Muscles For Inspiration] : no accessory muscle use [] : no respiratory distress [Abnormal Walk] : normal gait [Nail Clubbing] : no clubbing  or cyanosis of the fingernails [Musculoskeletal - Swelling] : no joint swelling seen [Skin Color & Pigmentation] : normal skin color and pigmentation [FreeTextEntry8] : no pronator drift

## 2019-12-30 ENCOUNTER — APPOINTMENT (OUTPATIENT)
Dept: MRI IMAGING | Facility: CLINIC | Age: 59
End: 2019-12-30
Payer: MEDICARE

## 2019-12-30 ENCOUNTER — OUTPATIENT (OUTPATIENT)
Dept: OUTPATIENT SERVICES | Facility: HOSPITAL | Age: 59
LOS: 1 days | End: 2019-12-30
Payer: COMMERCIAL

## 2019-12-30 DIAGNOSIS — I61.9 NONTRAUMATIC INTRACEREBRAL HEMORRHAGE, UNSPECIFIED: Chronic | ICD-10-CM

## 2019-12-30 DIAGNOSIS — Z95.5 PRESENCE OF CORONARY ANGIOPLASTY IMPLANT AND GRAFT: Chronic | ICD-10-CM

## 2019-12-30 DIAGNOSIS — Z00.8 ENCOUNTER FOR OTHER GENERAL EXAMINATION: ICD-10-CM

## 2019-12-30 PROCEDURE — 70551 MRI BRAIN STEM W/O DYE: CPT

## 2019-12-30 PROCEDURE — 70551 MRI BRAIN STEM W/O DYE: CPT | Mod: 26

## 2020-01-01 ENCOUNTER — NON-APPOINTMENT (OUTPATIENT)
Age: 60
End: 2020-01-01

## 2020-01-01 ENCOUNTER — APPOINTMENT (OUTPATIENT)
Dept: CARDIOLOGY | Facility: CLINIC | Age: 60
End: 2020-01-01
Payer: MEDICARE

## 2020-01-01 VITALS
OXYGEN SATURATION: 98 % | HEART RATE: 86 BPM | BODY MASS INDEX: 29.81 KG/M2 | TEMPERATURE: 97 F | HEIGHT: 62 IN | SYSTOLIC BLOOD PRESSURE: 145 MMHG | DIASTOLIC BLOOD PRESSURE: 94 MMHG

## 2020-01-01 VITALS — SYSTOLIC BLOOD PRESSURE: 131 MMHG | DIASTOLIC BLOOD PRESSURE: 78 MMHG

## 2020-01-01 VITALS — WEIGHT: 163 LBS | BODY MASS INDEX: 29.81 KG/M2

## 2020-01-01 DIAGNOSIS — I10 ESSENTIAL (PRIMARY) HYPERTENSION: ICD-10-CM

## 2020-01-01 DIAGNOSIS — I25.10 ATHEROSCLEROTIC HEART DISEASE OF NATIVE CORONARY ARTERY W/OUT ANGINA PECTORIS: ICD-10-CM

## 2020-01-01 PROCEDURE — 93000 ELECTROCARDIOGRAM COMPLETE: CPT

## 2020-01-01 PROCEDURE — 99213 OFFICE O/P EST LOW 20 MIN: CPT

## 2020-01-17 ENCOUNTER — APPOINTMENT (OUTPATIENT)
Dept: NEUROSURGERY | Facility: CLINIC | Age: 60
End: 2020-01-17

## 2020-02-03 NOTE — H&P ADULT - EYES
EOMI; PERRL; no drainage or redness Sski Pregnancy And Lactation Text: This medication is Pregnancy Category D and isn't considered safe during pregnancy. It is excreted in breast milk.

## 2020-02-06 ENCOUNTER — NON-APPOINTMENT (OUTPATIENT)
Age: 60
End: 2020-02-06

## 2020-02-06 ENCOUNTER — APPOINTMENT (OUTPATIENT)
Dept: CARDIOLOGY | Facility: CLINIC | Age: 60
End: 2020-02-06
Payer: MEDICARE

## 2020-02-06 VITALS
HEART RATE: 75 BPM | DIASTOLIC BLOOD PRESSURE: 80 MMHG | HEIGHT: 62 IN | SYSTOLIC BLOOD PRESSURE: 122 MMHG | BODY MASS INDEX: 30.36 KG/M2 | OXYGEN SATURATION: 98 %

## 2020-02-06 VITALS — WEIGHT: 166 LBS | BODY MASS INDEX: 30.36 KG/M2

## 2020-02-06 PROCEDURE — 93000 ELECTROCARDIOGRAM COMPLETE: CPT

## 2020-02-06 PROCEDURE — 99213 OFFICE O/P EST LOW 20 MIN: CPT

## 2020-02-06 RX ORDER — AMITRIPTYLINE HYDROCHLORIDE 100 MG/1
100 TABLET, FILM COATED ORAL
Refills: 0 | Status: ACTIVE | COMMUNITY

## 2020-02-06 NOTE — DISCUSSION/SUMMARY
[Coronary Artery Disease] : coronary artery disease [Hypertension] : hypertension [Stable] : stable [FreeTextEntry1] : \par Currently stable from a cardiovascular standpoint. Normotensive. Euvolemic. Stable CAD. No ischemic or CHF symptoms. Continue current medications. ECG completed today and reviewed. Follow up 6 months. Will schedule echo and stress after our next visit.

## 2020-02-06 NOTE — HISTORY OF PRESENT ILLNESS
[FreeTextEntry1] : Doing okay. Denies chest pain, shortness of breath or palpitations. Continues to experience headaches daily.

## 2020-02-06 NOTE — PHYSICAL EXAM
[General Appearance - Well Developed] : well developed [Normal Appearance] : normal appearance [Well Groomed] : well groomed [General Appearance - Well Nourished] : well nourished [No Deformities] : no deformities [General Appearance - In No Acute Distress] : no acute distress [Normal Conjunctiva] : the conjunctiva exhibited no abnormalities [Eyelids - No Xanthelasma] : the eyelids demonstrated no xanthelasmas [Normal Oral Mucosa] : normal oral mucosa [No Oral Pallor] : no oral pallor [No Oral Cyanosis] : no oral cyanosis [FreeTextEntry1] : no carotid bruits or JVD [Exaggerated Use Of Accessory Muscles For Inspiration] : no accessory muscle use [Respiration, Rhythm And Depth] : normal respiratory rhythm and effort [Auscultation Breath Sounds / Voice Sounds] : lungs were clear to auscultation bilaterally [Heart Rate And Rhythm] : heart rate and rhythm were normal [Heart Sounds] : normal S1 and S2 [Murmurs] : no murmurs present [Edema] : no peripheral edema present [Abdomen Soft] : soft [Abdomen Tenderness] : non-tender [Abnormal Walk] : normal gait [Gait - Sufficient For Exercise Testing] : the gait was sufficient for exercise testing [Cyanosis, Localized] : no localized cyanosis [Skin Color & Pigmentation] : normal skin color and pigmentation [] : no rash [No Venous Stasis] : no venous stasis [Oriented To Time, Place, And Person] : oriented to person, place, and time [Affect] : the affect was normal [Mood] : the mood was normal [No Anxiety] : not feeling anxious

## 2020-09-10 NOTE — HISTORY OF PRESENT ILLNESS
[FreeTextEntry1] : Doing okay. Denies chest pain, shortness of breath or palpitations. Has chronic headaches. Exercises by walking.

## 2020-09-10 NOTE — PHYSICAL EXAM
[General Appearance - Well Developed] : well developed [Normal Appearance] : normal appearance [Well Groomed] : well groomed [General Appearance - Well Nourished] : well nourished [No Deformities] : no deformities [General Appearance - In No Acute Distress] : no acute distress [Normal Conjunctiva] : the conjunctiva exhibited no abnormalities [Eyelids - No Xanthelasma] : the eyelids demonstrated no xanthelasmas [Normal Oral Mucosa] : normal oral mucosa [No Oral Pallor] : no oral pallor [No Oral Cyanosis] : no oral cyanosis [FreeTextEntry1] : no carotid bruits or JVD [Respiration, Rhythm And Depth] : normal respiratory rhythm and effort [Exaggerated Use Of Accessory Muscles For Inspiration] : no accessory muscle use [Auscultation Breath Sounds / Voice Sounds] : lungs were clear to auscultation bilaterally [Heart Rate And Rhythm] : heart rate and rhythm were normal [Heart Sounds] : normal S1 and S2 [Murmurs] : no murmurs present [Edema] : no peripheral edema present [Abdomen Soft] : soft [Abdomen Tenderness] : non-tender [Abnormal Walk] : normal gait [Gait - Sufficient For Exercise Testing] : the gait was sufficient for exercise testing [Cyanosis, Localized] : no localized cyanosis [Skin Color & Pigmentation] : normal skin color and pigmentation [] : no rash [No Venous Stasis] : no venous stasis [Oriented To Time, Place, And Person] : oriented to person, place, and time [Affect] : the affect was normal [Mood] : the mood was normal [No Anxiety] : not feeling anxious

## 2020-09-10 NOTE — DISCUSSION/SUMMARY
[Coronary Artery Disease] : coronary artery disease [Hypertension] : hypertension [Stable] : stable [FreeTextEntry1] : \par Currently stable from a cardiovascular standpoint. Normotensive. Euvolemic. Stable CAD. No ischemic or CHF symptoms. Patient with chronic headaches since having subdural hematoma. Continue current medications. ECG completed today and reviewed. Follow up in 6 months.

## 2021-01-01 ENCOUNTER — APPOINTMENT (OUTPATIENT)
Dept: CARDIOLOGY | Facility: CLINIC | Age: 61
End: 2021-01-01

## 2021-01-01 ENCOUNTER — INPATIENT (INPATIENT)
Facility: HOSPITAL | Age: 61
LOS: 3 days | End: 2021-03-06
Attending: HOSPITALIST | Admitting: HOSPITALIST
Payer: MEDICARE

## 2021-01-01 VITALS
OXYGEN SATURATION: 84 % | SYSTOLIC BLOOD PRESSURE: 120 MMHG | DIASTOLIC BLOOD PRESSURE: 70 MMHG | HEART RATE: 107 BPM | TEMPERATURE: 100 F | HEIGHT: 62 IN | RESPIRATION RATE: 28 BRPM

## 2021-01-01 VITALS
OXYGEN SATURATION: 91 % | DIASTOLIC BLOOD PRESSURE: 60 MMHG | TEMPERATURE: 97 F | SYSTOLIC BLOOD PRESSURE: 114 MMHG | RESPIRATION RATE: 18 BRPM

## 2021-01-01 DIAGNOSIS — R09.02 HYPOXEMIA: ICD-10-CM

## 2021-01-01 DIAGNOSIS — I61.9 NONTRAUMATIC INTRACEREBRAL HEMORRHAGE, UNSPECIFIED: Chronic | ICD-10-CM

## 2021-01-01 DIAGNOSIS — I10 ESSENTIAL (PRIMARY) HYPERTENSION: ICD-10-CM

## 2021-01-01 DIAGNOSIS — Z29.9 ENCOUNTER FOR PROPHYLACTIC MEASURES, UNSPECIFIED: ICD-10-CM

## 2021-01-01 DIAGNOSIS — Z95.5 PRESENCE OF CORONARY ANGIOPLASTY IMPLANT AND GRAFT: Chronic | ICD-10-CM

## 2021-01-01 DIAGNOSIS — I25.10 ATHEROSCLEROTIC HEART DISEASE OF NATIVE CORONARY ARTERY WITHOUT ANGINA PECTORIS: ICD-10-CM

## 2021-01-01 DIAGNOSIS — U07.1 COVID-19: ICD-10-CM

## 2021-01-01 DIAGNOSIS — E11.65 TYPE 2 DIABETES MELLITUS WITH HYPERGLYCEMIA: ICD-10-CM

## 2021-01-01 LAB
A1C WITH ESTIMATED AVERAGE GLUCOSE RESULT: 7.5 % — HIGH (ref 4–5.6)
ALBUMIN SERPL ELPH-MCNC: 2.9 G/DL — LOW (ref 3.3–5)
ALBUMIN SERPL ELPH-MCNC: 3.3 G/DL — SIGNIFICANT CHANGE UP (ref 3.3–5)
ALBUMIN SERPL ELPH-MCNC: 3.5 G/DL — SIGNIFICANT CHANGE UP (ref 3.3–5)
ALBUMIN SERPL ELPH-MCNC: 3.7 G/DL — SIGNIFICANT CHANGE UP (ref 3.3–5)
ALP SERPL-CCNC: 68 U/L — SIGNIFICANT CHANGE UP (ref 40–120)
ALP SERPL-CCNC: 69 U/L — SIGNIFICANT CHANGE UP (ref 40–120)
ALP SERPL-CCNC: 71 U/L — SIGNIFICANT CHANGE UP (ref 40–120)
ALP SERPL-CCNC: 74 U/L — SIGNIFICANT CHANGE UP (ref 40–120)
ALT FLD-CCNC: 33 U/L — SIGNIFICANT CHANGE UP (ref 4–41)
ALT FLD-CCNC: 41 U/L — SIGNIFICANT CHANGE UP (ref 4–41)
ALT FLD-CCNC: 41 U/L — SIGNIFICANT CHANGE UP (ref 4–41)
ALT FLD-CCNC: 42 U/L — HIGH (ref 4–41)
ANION GAP SERPL CALC-SCNC: 11 MMOL/L — SIGNIFICANT CHANGE UP (ref 7–14)
ANION GAP SERPL CALC-SCNC: 11 MMOL/L — SIGNIFICANT CHANGE UP (ref 7–14)
ANION GAP SERPL CALC-SCNC: 12 MMOL/L — SIGNIFICANT CHANGE UP (ref 7–14)
ANION GAP SERPL CALC-SCNC: 12 MMOL/L — SIGNIFICANT CHANGE UP (ref 7–14)
AST SERPL-CCNC: 100 U/L — HIGH (ref 4–40)
AST SERPL-CCNC: 102 U/L — HIGH (ref 4–40)
AST SERPL-CCNC: 108 U/L — HIGH (ref 4–40)
AST SERPL-CCNC: 76 U/L — HIGH (ref 4–40)
B PERT DNA SPEC QL NAA+PROBE: SIGNIFICANT CHANGE UP
BASOPHILS # BLD AUTO: 0 K/UL — SIGNIFICANT CHANGE UP (ref 0–0.2)
BASOPHILS NFR BLD AUTO: 0 % — SIGNIFICANT CHANGE UP (ref 0–2)
BILIRUB SERPL-MCNC: 0.3 MG/DL — SIGNIFICANT CHANGE UP (ref 0.2–1.2)
BILIRUB SERPL-MCNC: 0.4 MG/DL — SIGNIFICANT CHANGE UP (ref 0.2–1.2)
BUN SERPL-MCNC: 12 MG/DL — SIGNIFICANT CHANGE UP (ref 7–23)
BUN SERPL-MCNC: 18 MG/DL — SIGNIFICANT CHANGE UP (ref 7–23)
BUN SERPL-MCNC: 21 MG/DL — SIGNIFICANT CHANGE UP (ref 7–23)
BUN SERPL-MCNC: 22 MG/DL — SIGNIFICANT CHANGE UP (ref 7–23)
C PNEUM DNA SPEC QL NAA+PROBE: SIGNIFICANT CHANGE UP
CALCIUM SERPL-MCNC: 8.1 MG/DL — LOW (ref 8.4–10.5)
CALCIUM SERPL-MCNC: 8.4 MG/DL — SIGNIFICANT CHANGE UP (ref 8.4–10.5)
CALCIUM SERPL-MCNC: 8.4 MG/DL — SIGNIFICANT CHANGE UP (ref 8.4–10.5)
CALCIUM SERPL-MCNC: 8.7 MG/DL — SIGNIFICANT CHANGE UP (ref 8.4–10.5)
CHLORIDE SERPL-SCNC: 101 MMOL/L — SIGNIFICANT CHANGE UP (ref 98–107)
CHLORIDE SERPL-SCNC: 102 MMOL/L — SIGNIFICANT CHANGE UP (ref 98–107)
CHLORIDE SERPL-SCNC: 103 MMOL/L — SIGNIFICANT CHANGE UP (ref 98–107)
CHLORIDE SERPL-SCNC: 96 MMOL/L — LOW (ref 98–107)
CO2 SERPL-SCNC: 21 MMOL/L — LOW (ref 22–31)
CO2 SERPL-SCNC: 21 MMOL/L — LOW (ref 22–31)
CO2 SERPL-SCNC: 24 MMOL/L — SIGNIFICANT CHANGE UP (ref 22–31)
CO2 SERPL-SCNC: 25 MMOL/L — SIGNIFICANT CHANGE UP (ref 22–31)
CREAT SERPL-MCNC: 0.76 MG/DL — SIGNIFICANT CHANGE UP (ref 0.5–1.3)
CREAT SERPL-MCNC: 0.77 MG/DL — SIGNIFICANT CHANGE UP (ref 0.5–1.3)
CREAT SERPL-MCNC: 0.81 MG/DL — SIGNIFICANT CHANGE UP (ref 0.5–1.3)
CREAT SERPL-MCNC: 1.03 MG/DL — SIGNIFICANT CHANGE UP (ref 0.5–1.3)
CRP SERPL-MCNC: 35.9 MG/L — HIGH
CRP SERPL-MCNC: 57.5 MG/L — HIGH
CRP SERPL-MCNC: 80.2 MG/L — HIGH
D DIMER BLD IA.RAPID-MCNC: 278 NG/ML DDU — HIGH
D DIMER BLD IA.RAPID-MCNC: 300 NG/ML DDU — HIGH
D DIMER BLD IA.RAPID-MCNC: 319 NG/ML DDU — HIGH
EOSINOPHIL # BLD AUTO: 0 K/UL — SIGNIFICANT CHANGE UP (ref 0–0.5)
EOSINOPHIL NFR BLD AUTO: 0 % — SIGNIFICANT CHANGE UP (ref 0–6)
ESTIMATED AVERAGE GLUCOSE: 169 MG/DL — HIGH (ref 68–114)
FERRITIN SERPL-MCNC: 510 NG/ML — HIGH (ref 30–400)
FERRITIN SERPL-MCNC: 632 NG/ML — HIGH (ref 30–400)
FLUAV SUBTYP SPEC NAA+PROBE: SIGNIFICANT CHANGE UP
FLUBV RNA SPEC QL NAA+PROBE: SIGNIFICANT CHANGE UP
GLUCOSE SERPL-MCNC: 116 MG/DL — HIGH (ref 70–99)
GLUCOSE SERPL-MCNC: 116 MG/DL — HIGH (ref 70–99)
GLUCOSE SERPL-MCNC: 123 MG/DL — HIGH (ref 70–99)
GLUCOSE SERPL-MCNC: 163 MG/DL — HIGH (ref 70–99)
HADV DNA SPEC QL NAA+PROBE: SIGNIFICANT CHANGE UP
HCOV 229E RNA SPEC QL NAA+PROBE: SIGNIFICANT CHANGE UP
HCOV HKU1 RNA SPEC QL NAA+PROBE: SIGNIFICANT CHANGE UP
HCOV NL63 RNA SPEC QL NAA+PROBE: SIGNIFICANT CHANGE UP
HCOV OC43 RNA SPEC QL NAA+PROBE: SIGNIFICANT CHANGE UP
HCT VFR BLD CALC: 33 % — LOW (ref 39–50)
HCT VFR BLD CALC: 36.6 % — LOW (ref 39–50)
HCT VFR BLD CALC: 36.6 % — LOW (ref 39–50)
HCT VFR BLD CALC: 37.2 % — LOW (ref 39–50)
HCV AB S/CO SERPL IA: 0.05 S/CO — SIGNIFICANT CHANGE UP (ref 0–0.99)
HCV AB SERPL-IMP: SIGNIFICANT CHANGE UP
HGB BLD-MCNC: 10.7 G/DL — LOW (ref 13–17)
HGB BLD-MCNC: 11.6 G/DL — LOW (ref 13–17)
HGB BLD-MCNC: 11.7 G/DL — LOW (ref 13–17)
HGB BLD-MCNC: 11.7 G/DL — LOW (ref 13–17)
HMPV RNA SPEC QL NAA+PROBE: SIGNIFICANT CHANGE UP
HPIV1 RNA SPEC QL NAA+PROBE: SIGNIFICANT CHANGE UP
HPIV2 RNA SPEC QL NAA+PROBE: SIGNIFICANT CHANGE UP
HPIV3 RNA SPEC QL NAA+PROBE: SIGNIFICANT CHANGE UP
HPIV4 RNA SPEC QL NAA+PROBE: SIGNIFICANT CHANGE UP
IANC: 2.62 K/UL — SIGNIFICANT CHANGE UP (ref 1.5–8.5)
LDH SERPL L TO P-CCNC: 642 U/L — HIGH (ref 135–225)
LYMPHOCYTES # BLD AUTO: 0.28 K/UL — LOW (ref 1–3.3)
LYMPHOCYTES # BLD AUTO: 7.7 % — LOW (ref 13–44)
MAGNESIUM SERPL-MCNC: 1.9 MG/DL — SIGNIFICANT CHANGE UP (ref 1.6–2.6)
MAGNESIUM SERPL-MCNC: 2.1 MG/DL — SIGNIFICANT CHANGE UP (ref 1.6–2.6)
MCHC RBC-ENTMCNC: 24.9 PG — LOW (ref 27–34)
MCHC RBC-ENTMCNC: 25.2 PG — LOW (ref 27–34)
MCHC RBC-ENTMCNC: 25.2 PG — LOW (ref 27–34)
MCHC RBC-ENTMCNC: 25.5 PG — LOW (ref 27–34)
MCHC RBC-ENTMCNC: 31.5 GM/DL — LOW (ref 32–36)
MCHC RBC-ENTMCNC: 31.7 GM/DL — LOW (ref 32–36)
MCHC RBC-ENTMCNC: 32 GM/DL — SIGNIFICANT CHANGE UP (ref 32–36)
MCHC RBC-ENTMCNC: 32.4 GM/DL — SIGNIFICANT CHANGE UP (ref 32–36)
MCV RBC AUTO: 78.6 FL — LOW (ref 80–100)
MCV RBC AUTO: 78.9 FL — LOW (ref 80–100)
MCV RBC AUTO: 79.3 FL — LOW (ref 80–100)
MCV RBC AUTO: 79.4 FL — LOW (ref 80–100)
MONOCYTES # BLD AUTO: 0.14 K/UL — SIGNIFICANT CHANGE UP (ref 0–0.9)
MONOCYTES NFR BLD AUTO: 3.9 % — SIGNIFICANT CHANGE UP (ref 2–14)
NEUTROPHILS # BLD AUTO: 3.16 K/UL — SIGNIFICANT CHANGE UP (ref 1.8–7.4)
NEUTROPHILS NFR BLD AUTO: 78.8 % — HIGH (ref 43–77)
NRBC # BLD: 0 /100 WBCS — SIGNIFICANT CHANGE UP
NRBC # FLD: 0 K/UL — SIGNIFICANT CHANGE UP
OSMOLALITY UR: 225 MOSM/KG — SIGNIFICANT CHANGE UP (ref 50–1200)
PHOSPHATE SERPL-MCNC: 2.5 MG/DL — SIGNIFICANT CHANGE UP (ref 2.5–4.5)
PHOSPHATE SERPL-MCNC: 3.2 MG/DL — SIGNIFICANT CHANGE UP (ref 2.5–4.5)
PLATELET # BLD AUTO: 183 K/UL — SIGNIFICANT CHANGE UP (ref 150–400)
PLATELET # BLD AUTO: 216 K/UL — SIGNIFICANT CHANGE UP (ref 150–400)
PLATELET # BLD AUTO: 244 K/UL — SIGNIFICANT CHANGE UP (ref 150–400)
PLATELET # BLD AUTO: 270 K/UL — SIGNIFICANT CHANGE UP (ref 150–400)
POTASSIUM SERPL-MCNC: 4.3 MMOL/L — SIGNIFICANT CHANGE UP (ref 3.5–5.3)
POTASSIUM SERPL-MCNC: 4.3 MMOL/L — SIGNIFICANT CHANGE UP (ref 3.5–5.3)
POTASSIUM SERPL-MCNC: 4.4 MMOL/L — SIGNIFICANT CHANGE UP (ref 3.5–5.3)
POTASSIUM SERPL-MCNC: 4.6 MMOL/L — SIGNIFICANT CHANGE UP (ref 3.5–5.3)
POTASSIUM SERPL-SCNC: 4.3 MMOL/L — SIGNIFICANT CHANGE UP (ref 3.5–5.3)
POTASSIUM SERPL-SCNC: 4.3 MMOL/L — SIGNIFICANT CHANGE UP (ref 3.5–5.3)
POTASSIUM SERPL-SCNC: 4.4 MMOL/L — SIGNIFICANT CHANGE UP (ref 3.5–5.3)
POTASSIUM SERPL-SCNC: 4.6 MMOL/L — SIGNIFICANT CHANGE UP (ref 3.5–5.3)
PROCALCITONIN SERPL-MCNC: 0.24 NG/ML — HIGH (ref 0.02–0.1)
PROCALCITONIN SERPL-MCNC: 0.72 NG/ML — HIGH (ref 0.02–0.1)
PROT SERPL-MCNC: 5.8 G/DL — LOW (ref 6–8.3)
PROT SERPL-MCNC: 6.6 G/DL — SIGNIFICANT CHANGE UP (ref 6–8.3)
PROT SERPL-MCNC: 6.8 G/DL — SIGNIFICANT CHANGE UP (ref 6–8.3)
PROT SERPL-MCNC: 6.8 G/DL — SIGNIFICANT CHANGE UP (ref 6–8.3)
RAPID RVP RESULT: DETECTED
RBC # BLD: 4.2 M/UL — SIGNIFICANT CHANGE UP (ref 4.2–5.8)
RBC # BLD: 4.61 M/UL — SIGNIFICANT CHANGE UP (ref 4.2–5.8)
RBC # BLD: 4.64 M/UL — SIGNIFICANT CHANGE UP (ref 4.2–5.8)
RBC # BLD: 4.69 M/UL — SIGNIFICANT CHANGE UP (ref 4.2–5.8)
RBC # FLD: 13.8 % — SIGNIFICANT CHANGE UP (ref 10.3–14.5)
RBC # FLD: 13.9 % — SIGNIFICANT CHANGE UP (ref 10.3–14.5)
RBC # FLD: 14 % — SIGNIFICANT CHANGE UP (ref 10.3–14.5)
RBC # FLD: 14.1 % — SIGNIFICANT CHANGE UP (ref 10.3–14.5)
RSV RNA SPEC QL NAA+PROBE: SIGNIFICANT CHANGE UP
RV+EV RNA SPEC QL NAA+PROBE: SIGNIFICANT CHANGE UP
SARS-COV-2 IGG SERPL QL IA: NEGATIVE — SIGNIFICANT CHANGE UP
SARS-COV-2 IGM SERPL IA-ACNC: 0.07 INDEX — SIGNIFICANT CHANGE UP
SARS-COV-2 RNA SPEC QL NAA+PROBE: DETECTED
SARS-COV-2 RNA SPEC QL NAA+PROBE: DETECTED
SODIUM SERPL-SCNC: 129 MMOL/L — LOW (ref 135–145)
SODIUM SERPL-SCNC: 133 MMOL/L — LOW (ref 135–145)
SODIUM SERPL-SCNC: 138 MMOL/L — SIGNIFICANT CHANGE UP (ref 135–145)
SODIUM SERPL-SCNC: 139 MMOL/L — SIGNIFICANT CHANGE UP (ref 135–145)
SODIUM UR-SCNC: 21 MMOL/L — SIGNIFICANT CHANGE UP
TROPONIN T, HIGH SENSITIVITY RESULT: 24 NG/L — SIGNIFICANT CHANGE UP
TROPONIN T, HIGH SENSITIVITY RESULT: 25 NG/L — SIGNIFICANT CHANGE UP
WBC # BLD: 3.65 K/UL — LOW (ref 3.8–10.5)
WBC # BLD: 3.83 K/UL — SIGNIFICANT CHANGE UP (ref 3.8–10.5)
WBC # BLD: 5.39 K/UL — SIGNIFICANT CHANGE UP (ref 3.8–10.5)
WBC # BLD: 5.59 K/UL — SIGNIFICANT CHANGE UP (ref 3.8–10.5)
WBC # FLD AUTO: 3.65 K/UL — LOW (ref 3.8–10.5)
WBC # FLD AUTO: 3.83 K/UL — SIGNIFICANT CHANGE UP (ref 3.8–10.5)
WBC # FLD AUTO: 5.39 K/UL — SIGNIFICANT CHANGE UP (ref 3.8–10.5)
WBC # FLD AUTO: 5.59 K/UL — SIGNIFICANT CHANGE UP (ref 3.8–10.5)

## 2021-01-01 PROCEDURE — 99285 EMERGENCY DEPT VISIT HI MDM: CPT

## 2021-01-01 PROCEDURE — 99233 SBSQ HOSP IP/OBS HIGH 50: CPT

## 2021-01-01 PROCEDURE — 99223 1ST HOSP IP/OBS HIGH 75: CPT

## 2021-01-01 PROCEDURE — 71045 X-RAY EXAM CHEST 1 VIEW: CPT | Mod: 26

## 2021-01-01 PROCEDURE — 12345: CPT | Mod: NC

## 2021-01-01 RX ORDER — GLUCAGON INJECTION, SOLUTION 0.5 MG/.1ML
1 INJECTION, SOLUTION SUBCUTANEOUS ONCE
Refills: 0 | Status: DISCONTINUED | OUTPATIENT
Start: 2021-01-01 | End: 2021-01-01

## 2021-01-01 RX ORDER — SODIUM CHLORIDE 9 MG/ML
1000 INJECTION INTRAMUSCULAR; INTRAVENOUS; SUBCUTANEOUS ONCE
Refills: 0 | Status: COMPLETED | OUTPATIENT
Start: 2021-01-01 | End: 2021-01-01

## 2021-01-01 RX ORDER — DEXAMETHASONE 0.5 MG/5ML
6 ELIXIR ORAL ONCE
Refills: 0 | Status: DISCONTINUED | OUTPATIENT
Start: 2021-01-01 | End: 2021-01-01

## 2021-01-01 RX ORDER — DEXTROSE 50 % IN WATER 50 %
25 SYRINGE (ML) INTRAVENOUS ONCE
Refills: 0 | Status: DISCONTINUED | OUTPATIENT
Start: 2021-01-01 | End: 2021-01-01

## 2021-01-01 RX ORDER — DAPAGLIFLOZIN 10 MG/1
1 TABLET, FILM COATED ORAL
Qty: 0 | Refills: 0 | DISCHARGE

## 2021-01-01 RX ORDER — METFORMIN HYDROCHLORIDE 850 MG/1
1 TABLET ORAL
Qty: 0 | Refills: 0 | DISCHARGE

## 2021-01-01 RX ORDER — METOPROLOL TARTRATE 50 MG
1 TABLET ORAL
Qty: 0 | Refills: 0 | DISCHARGE

## 2021-01-01 RX ORDER — ACETAMINOPHEN 500 MG
975 TABLET ORAL ONCE
Refills: 0 | Status: COMPLETED | OUTPATIENT
Start: 2021-01-01 | End: 2021-01-01

## 2021-01-01 RX ORDER — ENOXAPARIN SODIUM 100 MG/ML
40 INJECTION SUBCUTANEOUS DAILY
Refills: 0 | Status: DISCONTINUED | OUTPATIENT
Start: 2021-01-01 | End: 2021-01-01

## 2021-01-01 RX ORDER — GEMFIBROZIL 600 MG
600 TABLET ORAL
Refills: 0 | Status: DISCONTINUED | OUTPATIENT
Start: 2021-01-01 | End: 2021-01-01

## 2021-01-01 RX ORDER — AMITRIPTYLINE HCL 25 MG
1 TABLET ORAL
Qty: 0 | Refills: 0 | DISCHARGE

## 2021-01-01 RX ORDER — REMDESIVIR 5 MG/ML
200 INJECTION INTRAVENOUS EVERY 24 HOURS
Refills: 0 | Status: COMPLETED | OUTPATIENT
Start: 2021-01-01 | End: 2021-01-01

## 2021-01-01 RX ORDER — REMDESIVIR 5 MG/ML
INJECTION INTRAVENOUS
Refills: 0 | Status: DISCONTINUED | OUTPATIENT
Start: 2021-01-01 | End: 2021-01-01

## 2021-01-01 RX ORDER — INSULIN LISPRO 100/ML
VIAL (ML) SUBCUTANEOUS
Refills: 0 | Status: DISCONTINUED | OUTPATIENT
Start: 2021-01-01 | End: 2021-01-01

## 2021-01-01 RX ORDER — SODIUM CHLORIDE 9 MG/ML
1000 INJECTION, SOLUTION INTRAVENOUS
Refills: 0 | Status: DISCONTINUED | OUTPATIENT
Start: 2021-01-01 | End: 2021-01-01

## 2021-01-01 RX ORDER — TOCILIZUMAB 20 MG/ML
600 INJECTION, SOLUTION, CONCENTRATE INTRAVENOUS ONCE
Refills: 0 | Status: COMPLETED | OUTPATIENT
Start: 2021-01-01 | End: 2021-01-01

## 2021-01-01 RX ORDER — DEXTROSE 50 % IN WATER 50 %
12.5 SYRINGE (ML) INTRAVENOUS ONCE
Refills: 0 | Status: DISCONTINUED | OUTPATIENT
Start: 2021-01-01 | End: 2021-01-01

## 2021-01-01 RX ORDER — ACETAMINOPHEN 500 MG
650 TABLET ORAL EVERY 6 HOURS
Refills: 0 | Status: DISCONTINUED | OUTPATIENT
Start: 2021-01-01 | End: 2021-01-01

## 2021-01-01 RX ORDER — INSULIN LISPRO 100/ML
VIAL (ML) SUBCUTANEOUS AT BEDTIME
Refills: 0 | Status: DISCONTINUED | OUTPATIENT
Start: 2021-01-01 | End: 2021-01-01

## 2021-01-01 RX ORDER — ACETAMINOPHEN 500 MG
650 TABLET ORAL ONCE
Refills: 0 | Status: COMPLETED | OUTPATIENT
Start: 2021-01-01 | End: 2021-01-01

## 2021-01-01 RX ORDER — DEXTROSE 50 % IN WATER 50 %
15 SYRINGE (ML) INTRAVENOUS ONCE
Refills: 0 | Status: DISCONTINUED | OUTPATIENT
Start: 2021-01-01 | End: 2021-01-01

## 2021-01-01 RX ORDER — ATORVASTATIN CALCIUM 80 MG/1
40 TABLET, FILM COATED ORAL AT BEDTIME
Refills: 0 | Status: DISCONTINUED | OUTPATIENT
Start: 2021-01-01 | End: 2021-01-01

## 2021-01-01 RX ORDER — METOPROLOL TARTRATE 50 MG
50 TABLET ORAL DAILY
Refills: 0 | Status: DISCONTINUED | OUTPATIENT
Start: 2021-01-01 | End: 2021-01-01

## 2021-01-01 RX ORDER — ASPIRIN/CALCIUM CARB/MAGNESIUM 324 MG
81 TABLET ORAL DAILY
Refills: 0 | Status: DISCONTINUED | OUTPATIENT
Start: 2021-01-01 | End: 2021-01-01

## 2021-01-01 RX ORDER — DEXAMETHASONE 0.5 MG/5ML
6 ELIXIR ORAL DAILY
Refills: 0 | Status: DISCONTINUED | OUTPATIENT
Start: 2021-01-01 | End: 2021-01-01

## 2021-01-01 RX ORDER — DEXAMETHASONE 0.5 MG/5ML
6 ELIXIR ORAL ONCE
Refills: 0 | Status: COMPLETED | OUTPATIENT
Start: 2021-01-01 | End: 2021-01-01

## 2021-01-01 RX ORDER — REMDESIVIR 5 MG/ML
100 INJECTION INTRAVENOUS EVERY 24 HOURS
Refills: 0 | Status: DISCONTINUED | OUTPATIENT
Start: 2021-01-01 | End: 2021-01-01

## 2021-01-01 RX ORDER — TOCILIZUMAB 20 MG/ML
400 INJECTION, SOLUTION, CONCENTRATE INTRAVENOUS ONCE
Refills: 0 | Status: DISCONTINUED | OUTPATIENT
Start: 2021-01-01 | End: 2021-01-01

## 2021-01-01 RX ORDER — AMITRIPTYLINE HCL 25 MG
100 TABLET ORAL AT BEDTIME
Refills: 0 | Status: DISCONTINUED | OUTPATIENT
Start: 2021-01-01 | End: 2021-01-01

## 2021-01-01 RX ADMIN — ENOXAPARIN SODIUM 40 MILLIGRAM(S): 100 INJECTION SUBCUTANEOUS at 13:24

## 2021-01-01 RX ADMIN — ATORVASTATIN CALCIUM 40 MILLIGRAM(S): 80 TABLET, FILM COATED ORAL at 21:44

## 2021-01-01 RX ADMIN — Medication 50 MILLIGRAM(S): at 10:15

## 2021-01-01 RX ADMIN — Medication 1: at 13:51

## 2021-01-01 RX ADMIN — ENOXAPARIN SODIUM 40 MILLIGRAM(S): 100 INJECTION SUBCUTANEOUS at 11:41

## 2021-01-01 RX ADMIN — ATORVASTATIN CALCIUM 40 MILLIGRAM(S): 80 TABLET, FILM COATED ORAL at 22:29

## 2021-01-01 RX ADMIN — Medication 50 MILLIGRAM(S): at 12:40

## 2021-01-01 RX ADMIN — Medication 100 MILLIGRAM(S): at 23:48

## 2021-01-01 RX ADMIN — Medication 50 MILLIGRAM(S): at 05:33

## 2021-01-01 RX ADMIN — ENOXAPARIN SODIUM 40 MILLIGRAM(S): 100 INJECTION SUBCUTANEOUS at 15:24

## 2021-01-01 RX ADMIN — Medication 600 MILLIGRAM(S): at 10:06

## 2021-01-01 RX ADMIN — Medication 100 MILLIGRAM(S): at 21:55

## 2021-01-01 RX ADMIN — ENOXAPARIN SODIUM 40 MILLIGRAM(S): 100 INJECTION SUBCUTANEOUS at 12:41

## 2021-01-01 RX ADMIN — SODIUM CHLORIDE 1000 MILLILITER(S): 9 INJECTION INTRAMUSCULAR; INTRAVENOUS; SUBCUTANEOUS at 04:44

## 2021-01-01 RX ADMIN — REMDESIVIR 500 MILLIGRAM(S): 5 INJECTION INTRAVENOUS at 12:40

## 2021-01-01 RX ADMIN — Medication 6 MILLIGRAM(S): at 05:13

## 2021-01-01 RX ADMIN — Medication 6 MILLIGRAM(S): at 02:28

## 2021-01-01 RX ADMIN — Medication 6 MILLIGRAM(S): at 05:39

## 2021-01-01 RX ADMIN — Medication 50 MILLIGRAM(S): at 09:05

## 2021-01-01 RX ADMIN — TOCILIZUMAB 100 MILLIGRAM(S): 20 INJECTION, SOLUTION, CONCENTRATE INTRAVENOUS at 13:12

## 2021-01-01 RX ADMIN — REMDESIVIR 500 MILLIGRAM(S): 5 INJECTION INTRAVENOUS at 11:35

## 2021-01-01 RX ADMIN — ATORVASTATIN CALCIUM 40 MILLIGRAM(S): 80 TABLET, FILM COATED ORAL at 23:02

## 2021-01-01 RX ADMIN — ATORVASTATIN CALCIUM 40 MILLIGRAM(S): 80 TABLET, FILM COATED ORAL at 21:56

## 2021-01-01 RX ADMIN — Medication 600 MILLIGRAM(S): at 09:05

## 2021-01-01 RX ADMIN — Medication 650 MILLIGRAM(S): at 17:33

## 2021-01-01 RX ADMIN — Medication 600 MILLIGRAM(S): at 11:26

## 2021-01-01 RX ADMIN — Medication 650 MILLIGRAM(S): at 23:22

## 2021-01-01 RX ADMIN — Medication 100 MILLIGRAM(S): at 21:44

## 2021-01-01 RX ADMIN — Medication 6 MILLIGRAM(S): at 05:33

## 2021-01-01 RX ADMIN — Medication 600 MILLIGRAM(S): at 10:16

## 2021-01-01 RX ADMIN — Medication 650 MILLIGRAM(S): at 00:51

## 2021-01-01 RX ADMIN — Medication 975 MILLIGRAM(S): at 02:28

## 2021-01-01 RX ADMIN — Medication 2: at 13:25

## 2021-01-01 RX ADMIN — REMDESIVIR 500 MILLIGRAM(S): 5 INJECTION INTRAVENOUS at 11:26

## 2021-01-01 RX ADMIN — REMDESIVIR 500 MILLIGRAM(S): 5 INJECTION INTRAVENOUS at 11:41

## 2021-01-01 RX ADMIN — Medication 2: at 13:11

## 2021-01-01 RX ADMIN — Medication 100 MILLIGRAM(S): at 23:02

## 2021-01-01 RX ADMIN — Medication 1: at 09:05

## 2021-01-01 RX ADMIN — Medication 81 MILLIGRAM(S): at 11:26

## 2021-01-01 RX ADMIN — Medication 1: at 18:49

## 2021-01-01 RX ADMIN — Medication 6 MILLIGRAM(S): at 07:19

## 2021-03-02 NOTE — ED ADULT TRIAGE NOTE - CHIEF COMPLAINT QUOTE
Pt. brought to [facility] ED for shortness of breath upon exertion. Family reports that pulse oximetry was low at the home. Pt. was diagnosed with covid a week ago. Last took tylenol 6 hours ago. T. max of 102 yesterday. Pt. appears well at triage. No complaint of discomfort. Terese Morales(son): [phone]. Placed on oxygen at triage.

## 2021-03-02 NOTE — ED PROVIDER NOTE - PHYSICAL EXAMINATION
Gen: Well appearing in mild resp distress  Head: NC/AT  Neck: trachea midline  Resp:  mild distress with increased WOB and tachypnea - coarse BS throughout  CV: Tachy RR  Ext: no deformities  Neuro:  A&O appears non focal  Skin:  Warm and dry as visualized

## 2021-03-02 NOTE — H&P ADULT - NSHPPHYSICALEXAM_GEN_ALL_CORE
PHYSICAL EXAM:  Vital Signs Last 24 Hrs  T(C): 37.3 (03-02-21 @ 04:25)  T(F): 99.1 (03-02-21 @ 04:25), Max: 102.7 (03-02-21 @ 03:16)  HR: 94 (03-02-21 @ 04:37) (87 - 107)  BP: 101/66 (03-02-21 @ 04:37)  BP(mean): --  RR: 33 (03-02-21 @ 04:37) (28 - 39)  SpO2: 97% (03-02-21 @ 04:37) (84% - 99%)  Wt(kg): --    Constitutional: NAD, awake and alert, well developed  EYES: EOMI, conjunctiva clear  ENT:  Normal Hearing, no tonsillar exudates   Neck: Soft and supple , no thyromegaly   Respiratory: mild crackles on bases, No wheezing or rhonchi, +mild tachypnea with shallow breaths, no accessory muscle use  Cardiovascular: S1 and S2, regular rate and rhythm, no Murmurs, gallops or rubs, no JVD, no leg edema  Gastrointestinal: Bowel Sounds present, soft, nontender, nondistended, no guarding, no rebound  Extremities: No cyanosis or clubbing; warm to touch  Vascular: 2+ peripheral pulses lower ex  Neurological: No focal deficits, CN II-XII intact bilaterally, sensation to light touch intact in all extremities. Pupils are equally reactive to light and symmetrical in size.   Skin: No rashes, no ulcerations

## 2021-03-02 NOTE — H&P ADULT - NSICDXPASTSURGICALHX_GEN_ALL_CORE_FT
PAST SURGICAL HISTORY:  Acute intracerebral hemorrhage     History of coronary angiogram stents - 4/2005 had 2 stents placed @ [facility] & then 6/2005 had 3 stents placed at [facility]    Stented coronary artery x8, last 3 in 2015

## 2021-03-02 NOTE — ED PROVIDER NOTE - OBJECTIVE STATEMENT
Pt is 59 yo with past medical history of CAD DM HLD and HTN  Presenting with a fever, cough and body aches over the last 4 days and SOB that started tonight  Symptoms have been getting progressively worse   SOB is present at rest and tonight had SpO2 in the mid 80s on home pulse ox   Pt tested positive for COVID on Friday

## 2021-03-02 NOTE — ED PROVIDER NOTE - CLINICAL SUMMARY MEDICAL DECISION MAKING FREE TEXT BOX
pt presenting with signs and symptoms of a COVID viral infection.  The presentation is complicated by the presence of significant hypoxia and increased WOB.  Is necessitating high flow supplemental oxygen to maintain saturations at an acceptable level.  CxR showing findings consistent with COVID with bilateral patchy opacities.  Currently respiratory status is stable.  Will need admission for management of their hypoxia and close monitoring of their respiratory status.

## 2021-03-02 NOTE — ED ADULT NURSE REASSESSMENT NOTE - NS ED NURSE REASSESS COMMENT FT1
MD Spence made aware of tachypnea and shallow respirations, sating 99% on 5L via NC. Pt denies difficulty breathing at this time. MD to be paged if pt desats/tripod position/accessory muscle use noted. Will continue to monitor.

## 2021-03-02 NOTE — H&P ADULT - ASSESSMENT
60M with PMHx CAD, HTN, HLD, DM presenting with worsened COVID-like symptoms for the last week. Diagnosed 2/25 per patient and had weakness, cough, SOB with exertion but also with exertion.

## 2021-03-02 NOTE — ED ADULT NURSE REASSESSMENT NOTE - NS ED NURSE REASSESS COMMENT FT1
report given to DAX garcía in [facility] [place]. pt awaiting transport to  [id number] at this time. will continue to monitor.

## 2021-03-02 NOTE — H&P ADULT - NSICDXPASTMEDICALHX_GEN_ALL_CORE_FT
PAST MEDICAL HISTORY:  CAD (coronary artery disease)     Diabetes mellitus diet control    Hypercholesteremia     Hypertension     Myocardial infarction 2005

## 2021-03-02 NOTE — H&P ADULT - NSHPREVIEWOFSYSTEMS_GEN_ALL_CORE
ROS:    Constitutional: [x ] fevers [ ] chills   HEENT: [ ] dry eyes [ ] eye irritation [ ] postnasal drip [ ] nasal congestion  CV: [ ] chest pain [ ] orthopnea [ ] palpitations [ ] murmur  Resp: [x ] cough [ x] shortness of breath [ ] dyspnea [ ] wheezing [ ] sputum [ ] hemoptysis  GI: [ ] nausea [ ] vomiting [ ] diarrhea [ ] constipation [ ] abd pain [ ] dysphagia   : [ ] dysuria [ ] nocturia [ ] hematuria [ ] increased urinary frequency  Musculoskeletal: [ ] back pain [ ] myalgias [ ] arthralgias [ ] fracture  Skin: [ ] rash [ ] itch  Neurological: [ ] headache [ ] dizziness [ ] syncope [ ] weakness [ ] numbness  Psychiatric: [ ] anxiety [ ] depression  Endocrine: [ ] diabetes [ ] thyroid problem  Hematologic/Lymphatic: [ ] anemia [ ] bleeding problem  Allergic/Immunologic: [ ] itchy eyes [ ] nasal discharge [ ] hives [ ] angioedema  [x ] All other systems negative

## 2021-03-02 NOTE — H&P ADULT - HISTORY OF PRESENT ILLNESS
60M with PMHx CAD, HTN, HLD, DM presenting with worsened COVID-like symptoms for the last week. Diagnosed 2/25 per patient and had weakness, cough, SOB with exertion but also with exertion. Denies fevers at home, abdominal sx, chest pain. Has had general body aches as well. He noticed hypoxia to low 80s on home pulse ox which prompted him to come to the hospital. In ED 87% on RA and improved to 96% on 5L NC, CXR with bilateral opacities, labs with elevated inflammatory markers, mild tachypnea but currently denying SOB. Given dex and tylenol for fever as well

## 2021-03-04 NOTE — PHARMACOTHERAPY INTERVENTION NOTE - COMMENTS
60 M with progressive respiratory hypoxemia, on dexamethasone and remdesivir.  Request made for tocilizumab.  Wt 71.5 kg.    Recommendation(s):  1) Tocilizumab approval is granted as patient meets all [place] criteria.    2) Based on weight of patient, dose of tocilizumab is 600 mg IV x 1 dose.  3) Please discuss risks (e.g., infection) vs. benefits with patient/caregiver.    Ban Martinez, PharmD  Infectious Diseases Clinical Pharmacist  Spectra extension [phone]  .

## 2021-03-05 NOTE — PROVIDER CONTACT NOTE (OTHER) - ACTION/TREATMENT ORDERED:
Prn tylenol admin, oral fluids encouraged. Recheck temp 100F (rectally). Continue to monitor.
ACP stated to place on NRM and reassess. O2 sat 96% with NRM.
reconnected pt back on O2 HF nasal cannula, O2 sat went up to 80s, settings was increased to 60/100. Pt tolerating well at this time. Seen by ACP. HOB elevated. Will continue to monitor

## 2021-03-05 NOTE — PROGRESS NOTE ADULT - PROBLEM SELECTOR PLAN 4
Hold home farxiga and metformin inpatient. ISS, FSG goal 140-180.

## 2021-03-05 NOTE — PROGRESS NOTE ADULT - PROBLEM SELECTOR PLAN 5
Lovenox 40mg qd  DASH TLC diet

## 2021-03-05 NOTE — PROVIDER CONTACT NOTE (OTHER) - REASON
O2 sat 83-85% on NC at 5L, raised to 6L with no improvement. Temp 99.3F orally, /60, HR 72
low O2 sat
Rectal temp 100.9F

## 2021-03-05 NOTE — PROVIDER CONTACT NOTE (OTHER) - ASSESSMENT
AA&Ox4. Hot to touch
Pt responsive, accidentally pulled out O2 while asleep, denies discomfort
AA&Ox4, denies difficulty breathing

## 2021-03-05 NOTE — PROGRESS NOTE ADULT - SUBJECTIVE AND OBJECTIVE BOX
PROGRESS NOTE:   Authored by Dr. Becky Arana MD, pager [phone]     Patient is a 60y old  Male who presents with a chief complaint of SOB, hypoxia to 80s on home pulse ox, COVID positive 2/25 (02 Mar 2021 15:08)      SUBJECTIVE / OVERNIGHT EVENTS:  Patient states he becomes SOB when ambulating to bathroom, but is otherwise comfortable at rest. He was tachycardic to 110s; EKG was consistent with sinus tach.     ADDITIONAL REVIEW OF SYSTEMS:  REVIEW OF SYSTEMS:    CONSTITUTIONAL: No weakness, fevers or chills  EYES/ENT: No visual changes;  No vertigo or throat pain   NECK: No pain or stiffness  RESPIRATORY: No cough, wheezing, hemoptysis; No shortness of breath  CARDIOVASCULAR: No chest pain or palpitations  GASTROINTESTINAL: No abdominal or epigastric pain. No nausea, vomiting, or hematemesis; No diarrhea or constipation. No melena or hematochezia.  GENITOURINARY: No dysuria, frequency or hematuria  NEUROLOGICAL: No numbness or weakness  PSYCH: No A/V hallucinations  MSK: No joint pain  SKIN: No itching, rashes      MEDICATIONS  (STANDING):  amitriptyline 100 milliGRAM(s) Oral at bedtime  atorvastatin 40 milliGRAM(s) Oral at bedtime  dexAMETHasone     Tablet 6 milliGRAM(s) Oral daily  dextrose 40% Gel 15 Gram(s) Oral once  dextrose 5%. 1000 milliLiter(s) (50 mL/Hr) IV Continuous <Continuous>  dextrose 5%. 1000 milliLiter(s) (100 mL/Hr) IV Continuous <Continuous>  dextrose 50% Injectable 25 Gram(s) IV Push once  dextrose 50% Injectable 12.5 Gram(s) IV Push once  dextrose 50% Injectable 25 Gram(s) IV Push once  enoxaparin Injectable 40 milliGRAM(s) SubCutaneous daily  gemfibrozil 600 milliGRAM(s) Oral <User Schedule>  glucagon  Injectable 1 milliGRAM(s) IntraMuscular once  insulin lispro (ADMELOG) corrective regimen sliding scale   SubCutaneous three times a day before meals  insulin lispro (ADMELOG) corrective regimen sliding scale   SubCutaneous at bedtime  metoprolol succinate ER 50 milliGRAM(s) Oral daily  remdesivir  IVPB   IV Intermittent   remdesivir  IVPB 100 milliGRAM(s) IV Intermittent every 24 hours    MEDICATIONS  (PRN):  acetaminophen   Tablet .. 650 milliGRAM(s) Oral every 6 hours PRN Temp greater or equal to 38C (100.4F)  guaiFENesin   Syrup  (Sugar-Free) 200 milliGRAM(s) Oral every 6 hours PRN Cough      CAPILLARY BLOOD GLUCOSE      POCT Blood Glucose.: 145 mg/dL (03 Mar 2021 13:15)  POCT Blood Glucose.: 162 mg/dL (02 Mar 2021 21:20)  POCT Blood Glucose.: 119 mg/dL (02 Mar 2021 17:54)    I&O's Summary    02 Mar 2021 07:01  -  03 Mar 2021 07:00  --------------------------------------------------------  IN: 370 mL / OUT: 650 mL / NET: -280 mL        PHYSICAL EXAM:  Vital Signs Last 24 Hrs  T(C): 37.5 (03 Mar 2021 10:07), Max: 37.9 (03 Mar 2021 06:28)  T(F): 99.5 (03 Mar 2021 10:07), Max: 100.2 (03 Mar 2021 06:28)  HR: 114 (03 Mar 2021 10:07) (81 - 114)  BP: 109/73 (03 Mar 2021 10:07) (100/63 - 123/68)  BP(mean): --  RR: 20 (03 Mar 2021 10:07) (18 - 20)  SpO2: 94% (03 Mar 2021 10:07) (92% - 98%)    CONSTITUTIONAL: NAD, well-developed  RESPIRATORY: Normal respiratory effort; lungs are clear to auscultation bilaterally  CARDIOVASCULAR: Regular rate and rhythm, normal S1 and S2, no murmur/rub/gallop; No lower extremity edema; Peripheral pulses are 2+ bilaterally  ABDOMEN: Nontender to palpation, normoactive bowel sounds, no rebound/guarding; No hepatosplenomegaly  MUSCULOSKELETAL: no clubbing or cyanosis of digits; no joint swelling or tenderness to palpation  PSYCH: A+O to person, place, and time; affect appropriate    LABS:                        11.7   5.39  )-----------( 216      ( 03 Mar 2021 04:55 )             37.2     03-03    133<L>  |  101  |  18  ----------------------------<  116<H>  4.3   |  21<L>  |  0.81    Ca    8.4      03 Mar 2021 04:55  Phos  2.5     03-03  Mg     1.9     03-03    TPro  6.6  /  Alb  3.3  /  TBili  0.3  /  DBili  x   /  AST  100<H>  /  ALT  41  /  AlkPhos  71  03-03                RADIOLOGY & ADDITIONAL TESTS:  Results Reviewed:   Imaging Personally Reviewed:  Electrocardiogram Personally Reviewed:    COORDINATION OF CARE:  Care Discussed with Consultants/Other Providers [Y/N]:  Prior or Outpatient Records Reviewed [Y/N]:  
PROGRESS NOTE:   Authored by Dr. Becky Arana MD, pager [phone]     Patient is a 60y old  Male who presents with a chief complaint of SOB, hypoxia to 80s on home pulse ox, COVID positive 2/25 (02 Mar 2021 05:06)      SUBJECTIVE / OVERNIGHT EVENTS:  Pt states he is breathing more comfortably on supplemental oxygen.     ADDITIONAL REVIEW OF SYSTEMS:  REVIEW OF SYSTEMS:    CONSTITUTIONAL: No weakness, fevers or chills  EYES/ENT: No visual changes;  No vertigo or throat pain   NECK: No pain or stiffness  RESPIRATORY: No cough, wheezing, hemoptysis; No shortness of breath  CARDIOVASCULAR: No chest pain or palpitations  GASTROINTESTINAL: No abdominal or epigastric pain. No nausea, vomiting, or hematemesis; No diarrhea or constipation. No melena or hematochezia.  GENITOURINARY: No dysuria, frequency or hematuria  NEUROLOGICAL: No numbness or weakness  PSYCH: No A/V hallucinations  MSK: No joint pain  SKIN: No itching, rashes      MEDICATIONS  (STANDING):  amitriptyline 100 milliGRAM(s) Oral at bedtime  aspirin enteric coated 81 milliGRAM(s) Oral daily  atorvastatin 40 milliGRAM(s) Oral at bedtime  dextrose 40% Gel 15 Gram(s) Oral once  dextrose 5%. 1000 milliLiter(s) (50 mL/Hr) IV Continuous <Continuous>  dextrose 5%. 1000 milliLiter(s) (100 mL/Hr) IV Continuous <Continuous>  dextrose 50% Injectable 25 Gram(s) IV Push once  dextrose 50% Injectable 12.5 Gram(s) IV Push once  dextrose 50% Injectable 25 Gram(s) IV Push once  enoxaparin Injectable 40 milliGRAM(s) SubCutaneous daily  gemfibrozil 600 milliGRAM(s) Oral <User Schedule>  glucagon  Injectable 1 milliGRAM(s) IntraMuscular once  insulin lispro (ADMELOG) corrective regimen sliding scale   SubCutaneous three times a day before meals  insulin lispro (ADMELOG) corrective regimen sliding scale   SubCutaneous at bedtime  metoprolol succinate ER 50 milliGRAM(s) Oral daily  remdesivir  IVPB   IV Intermittent     MEDICATIONS  (PRN):  acetaminophen   Tablet .. 650 milliGRAM(s) Oral every 6 hours PRN Temp greater or equal to 38C (100.4F)  guaiFENesin   Syrup  (Sugar-Free) 200 milliGRAM(s) Oral every 6 hours PRN Cough      CAPILLARY BLOOD GLUCOSE      POCT Blood Glucose.: 160 mg/dL (02 Mar 2021 13:12)  POCT Blood Glucose.: 147 mg/dL (02 Mar 2021 09:35)    I&O's Summary      PHYSICAL EXAM:  Vital Signs Last 24 Hrs  T(C): 36.7 (02 Mar 2021 13:03), Max: 39.3 (02 Mar 2021 03:16)  T(F): 98 (02 Mar 2021 13:03), Max: 102.7 (02 Mar 2021 03:16)  HR: 80 (02 Mar 2021 13:03) (80 - 107)  BP: 122/74 (02 Mar 2021 13:03) (96/53 - 139/62)  BP(mean): 71 (02 Mar 2021 08:39) (71 - 71)  RR: 18 (02 Mar 2021 13:03) (18 - 39)  SpO2: 96% (02 Mar 2021 13:03) (84% - 100%)    CONSTITUTIONAL: NAD, well developed   RESPIRATORY: mild tachypnea; mild bibasilar crackles   CARDIOVASCULAR: Regular rate and rhythm, normal S1 and S2, no murmur/rub/gallop; No lower extremity edema; Peripheral pulses are 2+ bilaterally  ABDOMEN: Nontender to palpation, normoactive bowel sounds, no rebound/guarding; No hepatosplenomegaly  MUSCULOSKELETAL: no clubbing or cyanosis of digits; no joint swelling or tenderness to palpation  PSYCH: A+O to person, place, and time; affect appropriate    LABS:                        11.6   3.65  )-----------( 183      ( 02 Mar 2021 02:50 )             36.6     03-02    129<L>  |  96<L>  |  12  ----------------------------<  116<H>  4.4   |  21<L>  |  1.03    Ca    8.1<L>      02 Mar 2021 02:50    TPro  6.8  /  Alb  3.5  /  TBili  0.3  /  DBili  x   /  AST  108<H>  /  ALT  42<H>  /  AlkPhos  69  03-02                RADIOLOGY & ADDITIONAL TESTS:  Results Reviewed:   Imaging Personally Reviewed:  Electrocardiogram Personally Reviewed:    COORDINATION OF CARE:  Care Discussed with Consultants/Other Providers [Y/N]:  Prior or Outpatient Records Reviewed [Y/N]:  
PROGRESS NOTE:   Authored by Dr. Becky Arana MD, pager [phone]     Patient is a 60y old  Male who presents with a chief complaint of SOB, hypoxia to 80s on home pulse ox, COVID positive 2/25 (03 Mar 2021 16:06)      SUBJECTIVE / OVERNIGHT EVENTS:  Patient desatted to 85  on 5 L NC last night and he was escalated to NRB. This morning, he was noted to be tachypneic on NRB; he is now escalated to HFNC. He denies SOB.     ADDITIONAL REVIEW OF SYSTEMS:  REVIEW OF SYSTEMS:    CONSTITUTIONAL: No weakness, fevers or chills  EYES/ENT: No visual changes;  No vertigo or throat pain   NECK: No pain or stiffness  RESPIRATORY: No cough, wheezing, hemoptysis; No shortness of breath  CARDIOVASCULAR: No chest pain or palpitations  GASTROINTESTINAL: No abdominal or epigastric pain. No nausea, vomiting, or hematemesis; No diarrhea or constipation. No melena or hematochezia.  GENITOURINARY: No dysuria, frequency or hematuria  NEUROLOGICAL: No numbness or weakness  PSYCH: No A/V hallucinations  MSK: No joint pain  SKIN: No itching, rashes      MEDICATIONS  (STANDING):  amitriptyline 100 milliGRAM(s) Oral at bedtime  atorvastatin 40 milliGRAM(s) Oral at bedtime  dexAMETHasone     Tablet 6 milliGRAM(s) Oral daily  dextrose 40% Gel 15 Gram(s) Oral once  dextrose 5%. 1000 milliLiter(s) (50 mL/Hr) IV Continuous <Continuous>  dextrose 5%. 1000 milliLiter(s) (100 mL/Hr) IV Continuous <Continuous>  dextrose 50% Injectable 25 Gram(s) IV Push once  dextrose 50% Injectable 12.5 Gram(s) IV Push once  dextrose 50% Injectable 25 Gram(s) IV Push once  enoxaparin Injectable 40 milliGRAM(s) SubCutaneous daily  gemfibrozil 600 milliGRAM(s) Oral <User Schedule>  glucagon  Injectable 1 milliGRAM(s) IntraMuscular once  insulin lispro (ADMELOG) corrective regimen sliding scale   SubCutaneous three times a day before meals  insulin lispro (ADMELOG) corrective regimen sliding scale   SubCutaneous at bedtime  metoprolol succinate ER 50 milliGRAM(s) Oral daily  remdesivir  IVPB   IV Intermittent   remdesivir  IVPB 100 milliGRAM(s) IV Intermittent every 24 hours    MEDICATIONS  (PRN):  acetaminophen   Tablet .. 650 milliGRAM(s) Oral every 6 hours PRN Temp greater or equal to 38C (100.4F)  guaiFENesin   Syrup  (Sugar-Free) 200 milliGRAM(s) Oral every 6 hours PRN Cough      CAPILLARY BLOOD GLUCOSE      POCT Blood Glucose.: 231 mg/dL (04 Mar 2021 12:59)  POCT Blood Glucose.: 123 mg/dL (04 Mar 2021 09:15)  POCT Blood Glucose.: 129 mg/dL (03 Mar 2021 21:09)  POCT Blood Glucose.: 161 mg/dL (03 Mar 2021 18:22)    I&O's Summary    03 Mar 2021 07:01  -  04 Mar 2021 07:00  --------------------------------------------------------  IN: 970 mL / OUT: 0 mL / NET: 970 mL        PHYSICAL EXAM:  Vital Signs Last 24 Hrs  T(C): 36.9 (04 Mar 2021 11:38), Max: 38.3 (03 Mar 2021 23:12)  T(F): 98.5 (04 Mar 2021 11:38), Max: 100.9 (03 Mar 2021 23:12)  HR: 75 (04 Mar 2021 13:38) (61 - 92)  BP: 103/65 (04 Mar 2021 11:38) (97/60 - 120/78)  BP(mean): --  RR: 18 (04 Mar 2021 13:38) (18 - 24)  SpO2: 97% (04 Mar 2021 13:38) (85% - 100%)    CONSTITUTIONAL: NAD, well-developed  RESPIRATORY: Normal respiratory effort; diminished breath sounds on mid and lower lung fields b/l  CARDIOVASCULAR: Regular rate and rhythm, normal S1 and S2, no murmur/rub/gallop; No lower extremity edema; Peripheral pulses are 2+ bilaterally  ABDOMEN: Nontender to palpation, normoactive bowel sounds, no rebound/guarding; No hepatosplenomegaly  MUSCULOSKELETAL: no clubbing or cyanosis of digits; no joint swelling or tenderness to palpation  PSYCH: A+O to person, place, and time; affect appropriate    LABS:                        11.7   5.59  )-----------( 244      ( 04 Mar 2021 10:40 )             36.6     03-04    139  |  102  |  21  ----------------------------<  163<H>  4.6   |  25  |  0.77    Ca    8.7      04 Mar 2021 10:40  Phos  3.2     03-04  Mg     2.1     03-04    TPro  6.8  /  Alb  3.7  /  TBili  0.4  /  DBili  x   /  AST  102<H>  /  ALT  41  /  AlkPhos  74  03-04                RADIOLOGY & ADDITIONAL TESTS:  Results Reviewed:   Imaging Personally Reviewed:  Electrocardiogram Personally Reviewed:    COORDINATION OF CARE:  Care Discussed with Consultants/Other Providers [Y/N]:  Prior or Outpatient Records Reviewed [Y/N]:  
PROGRESS NOTE:   Authored by Dr. Becky Arana MD, pager [phone]     Patient is a 60y old  Male who presents with a chief complaint of SOB, hypoxia to 80s on home pulse ox, COVID positive 2/25 (04 Mar 2021 13:54)      SUBJECTIVE / OVERNIGHT EVENTS:  Pt accidentally pulled out his HFNC ON and his O2 saturation decreased to 60s; HFNC was replaced and O2 sat improved appropriately. Pt denies SOB at this time.     ADDITIONAL REVIEW OF SYSTEMS:  REVIEW OF SYSTEMS:    CONSTITUTIONAL: No weakness, fevers or chills  EYES/ENT: No visual changes;  No vertigo or throat pain   NECK: No pain or stiffness  RESPIRATORY: No cough, wheezing, hemoptysis; No shortness of breath  CARDIOVASCULAR: No chest pain or palpitations  GASTROINTESTINAL: No abdominal or epigastric pain. No nausea, vomiting, or hematemesis; No diarrhea or constipation. No melena or hematochezia.  GENITOURINARY: No dysuria, frequency or hematuria  NEUROLOGICAL: No numbness or weakness  PSYCH: No A/V hallucinations  MSK: No joint pain  SKIN: No itching, rashes      MEDICATIONS  (STANDING):  amitriptyline 100 milliGRAM(s) Oral at bedtime  atorvastatin 40 milliGRAM(s) Oral at bedtime  dexAMETHasone     Tablet 6 milliGRAM(s) Oral daily  dextrose 40% Gel 15 Gram(s) Oral once  dextrose 5%. 1000 milliLiter(s) (50 mL/Hr) IV Continuous <Continuous>  dextrose 5%. 1000 milliLiter(s) (100 mL/Hr) IV Continuous <Continuous>  dextrose 50% Injectable 25 Gram(s) IV Push once  dextrose 50% Injectable 12.5 Gram(s) IV Push once  dextrose 50% Injectable 25 Gram(s) IV Push once  enoxaparin Injectable 40 milliGRAM(s) SubCutaneous daily  gemfibrozil 600 milliGRAM(s) Oral <User Schedule>  glucagon  Injectable 1 milliGRAM(s) IntraMuscular once  insulin lispro (ADMELOG) corrective regimen sliding scale   SubCutaneous three times a day before meals  insulin lispro (ADMELOG) corrective regimen sliding scale   SubCutaneous at bedtime  metoprolol succinate ER 50 milliGRAM(s) Oral daily  remdesivir  IVPB   IV Intermittent   remdesivir  IVPB 100 milliGRAM(s) IV Intermittent every 24 hours    MEDICATIONS  (PRN):  acetaminophen   Tablet .. 650 milliGRAM(s) Oral every 6 hours PRN Temp greater or equal to 38C (100.4F)  guaiFENesin   Syrup  (Sugar-Free) 200 milliGRAM(s) Oral every 6 hours PRN Cough      CAPILLARY BLOOD GLUCOSE      POCT Blood Glucose.: 227 mg/dL (05 Mar 2021 13:08)  POCT Blood Glucose.: 153 mg/dL (05 Mar 2021 08:52)  POCT Blood Glucose.: 182 mg/dL (04 Mar 2021 21:37)  POCT Blood Glucose.: 142 mg/dL (04 Mar 2021 17:52)    I&O's Summary    04 Mar 2021 07:01  -  05 Mar 2021 07:00  --------------------------------------------------------  IN: 0 mL / OUT: 500 mL / NET: -500 mL        PHYSICAL EXAM:  Vital Signs Last 24 Hrs  T(C): 36.7 (05 Mar 2021 12:58), Max: 37.1 (04 Mar 2021 21:39)  T(F): 98 (05 Mar 2021 12:58), Max: 98.7 (04 Mar 2021 21:39)  HR: 62 (05 Mar 2021 12:58) (54 - 79)  BP: 102/89 (05 Mar 2021 12:58) (102/89 - 120/72)  BP(mean): --  RR: 18 (05 Mar 2021 12:58) (17 - 20)  SpO2: 95% (05 Mar 2021 12:58) (91% - 100%)    CONSTITUTIONAL: NAD, well-developed  RESPIRATORY: Normal respiratory effort; diminished breath sounds on mid and lower lung fields b/l  CARDIOVASCULAR: Regular rate and rhythm, normal S1 and S2, no murmur/rub/gallop; No lower extremity edema; Peripheral pulses are 2+ bilaterally  ABDOMEN: Nontender to palpation, normoactive bowel sounds, no rebound/guarding; No hepatosplenomegaly  MUSCULOSKELETAL: no clubbing or cyanosis of digits; no joint swelling or tenderness to palpation  PSYCH: A+O to person, place, and time; affect appropriate  LABS:                        10.7   3.83  )-----------( 270      ( 05 Mar 2021 06:47 )             33.0     03-05    138  |  103  |  22  ----------------------------<  123<H>  4.3   |  24  |  0.76    Ca    8.4      05 Mar 2021 06:47  Phos  3.2     03-04  Mg     2.1     03-04    TPro  5.8<L>  /  Alb  2.9<L>  /  TBili  0.3  /  DBili  x   /  AST  76<H>  /  ALT  33  /  AlkPhos  68  03-05                RADIOLOGY & ADDITIONAL TESTS:  Results Reviewed:   Imaging Personally Reviewed:  Electrocardiogram Personally Reviewed:    COORDINATION OF CARE:  Care Discussed with Consultants/Other Providers [Y/N]:  Prior or Outpatient Records Reviewed [Y/N]:

## 2021-03-05 NOTE — PROGRESS NOTE ADULT - PROBLEM SELECTOR PROBLEM 2
Coronary artery disease involving native heart, angina presence unspecified, unspecified vessel or lesion type

## 2021-03-05 NOTE — PROGRESS NOTE ADULT - PROBLEM SELECTOR PLAN 2
Resume home statin, gemfibrozil, metoprolol. Hold home enalapril for now given soft BP

## 2021-03-05 NOTE — PROGRESS NOTE ADULT - ASSESSMENT
60M with PMHx CAD, HTN, HLD, DM presenting with acute hypoxemic respiratory failure 2/2 COVID-19 PNA. He was initially saturating appropriately on 5L NC, but then desaturated ON 3/3 and was escalated to NRB. Given persistent hypoxia and tachypnea, now on HFNC. 
60M with PMHx CAD, HTN, HLD, DM presenting with worsened COVID-like symptoms for the last week. Diagnosed 2/25 per patient and had weakness, cough, SOB with exertion. 
60M with PMHx CAD, HTN, HLD, DM presenting with acute hypoxemic respiratory failure 2/2 COVID-19 PNA. He was initially saturating appropriately on 5L NC, but then desaturated ON 3/3 and was escalated to NRB. Given persistent hypoxia and tachypnea, now on HFNC. 
60M with PMHx CAD, HTN, HLD, DM presenting with worsened COVID-like symptoms for the last week. Diagnosed 2/25 per patient and had weakness, cough, SOB with exertion. Continues to require 5 L supplemental O2 via NC.

## 2021-03-05 NOTE — PROGRESS NOTE ADULT - PROBLEM SELECTOR PLAN 1
Hypoxic to 87% on RA upon admission, then was saturating appropriately on NC, but then O2 requirements escalated to NRB and now to HFNC  -Given worsening hypoxemia, spoke to pt and family and decision was made to initiate patient on Tocilizumab, to be administered today, 3/4. Risks and benefits were discussed.   -continue Remdesivir and Decadron  -tylenol PRN, cough suppressants  -Lovenox 40mg qd
Hypoxic to 87% on RA, now 96% on 5L NC, mild tachypnea but denying SOB, CXR with bilateral lower opacities  -monitor on continuous pulse ox and wean O2 as tolerated  -tylenol PRN, cough suppressants  -Lovenox 40mg qd  -remdesivir and decadron  -contact and airborn isolations  D-dimer 319, ferritin 632, CRP 80.2, procalcitonin 0.72, troponin 24
Hypoxic to 87% on RA, now approximately 92-96%% on 5L NC, mild tachypnea but denying SOB, CXR with bilateral lower opacities  -monitor on continuous pulse ox and wean O2 as tolerated  -tylenol PRN, cough suppressants  -Lovenox 40mg qd  -remdesivir and decadron  -contact and airborn isolations  D-dimer 319, ferritin 632, CRP 80.2, procalcitonin 0.72, troponin 24
Hypoxic to 87% on RA upon admission, then was saturating appropriately on NC, but then O2 requirements escalated to NRB and now to HFNC  -continue on HFNC  -Given worsening hypoxemia, spoke to pt and family and decision was made to administer Tocilizumab on 3/4. Risks and benefits were discussed.   -continue Remdesivir and Decadron  -tylenol PRN, cough suppressants  -Lovenox 40mg qd

## 2021-03-05 NOTE — PROVIDER CONTACT NOTE (OTHER) - SITUATION
Rectal temp 100.9F, HR 73
Pt's O2 sat was 60s
O2 sat 83-85% on NC at 5L, raised to 6L with no improvement. Pt sitting comfortably, RR 22, denies chest pain, denies difficulty breathing. Temp 99.3F, /60, HR 72

## 2021-03-05 NOTE — PROVIDER CONTACT NOTE (OTHER) - BACKGROUND
Admitted with fevers, Covid positive.
Covid positive, pt de-saturated on NC at 5L, was placed on NRM at 15L
Pt admitted with hypoxemia, covid 19 positive

## 2021-03-05 NOTE — PROGRESS NOTE ADULT - REASON FOR ADMISSION
SOB, hypoxia to 80s on home pulse ox, COVID positive 2/25

## 2021-03-06 NOTE — CHART NOTE - NSCHARTNOTEFT_GEN_A_CORE
Patient found pulseless and unresponsive by bedside nurse, Code Blue activated by nursing staff.    Patient did not respond to verbal or noxious stimuli.  No spontaneous respirations  Absent heart and breath sounds.  Absent radial and carotid pulses  Pupils are fixed and dilated, no corneal reflex.  Patient pronounced dead at 0750.  Dr. Gustafson made aware.  Family (sonTerese, at [phone]) made aware.
CODE BLUE activated at 6:50am on 3/6/21 after the patient was found down on the floor and noted to be pulseless and apneic. Upon arrival to the scene, a RRT was being run on the bedmate while the code was being run on the patient. The patient was receiving CPR and bag valve ventilations. The first pulse/rhythm check showed asystole. The first epi was given. Repeat pulse check continued to be asystole. A second epi and an amp of bicarb was given. Anesthesia arrived and intubated. An IO was placed. Rhythm remained asystole. Third epi given. Rhythm remained pulseless asystole. 4th epi given. Repeat pulse/rhythm check was pulseless asystole. Pupils were fixed and dilated. There was absent breath and heart sounds. Time of death declared at 7:05am.     Venkatesh Ayala MD/MS  Internal Medicine, PGY-3  [facility]  Pager# [phone] ([facility])/[phone] ([facility])

## 2021-03-06 NOTE — DISCHARGE NOTE FOR THE EXPIRED PATIENT - HOSPITAL COURSE
60M with PMHx CAD, HTN, HLD, DM presenting with acute hypoxemic respiratory failure 2/2 COVID-19 PNA. He was initially saturating appropriately on 5L NC, but then desaturated ON 3/3 and was escalated to NRB and given persistent hypoxia and tachypnea, escalated to HFNC.     COVID-19  - Hypoxic to 87% on RA upon admission, O2 requirements escalated to NRB and HFNC  - Given worsening hypoxemia, spoke to pt and family and decision was made to administer Tocilizumab on 3/4. Risks and benefits were discussed.  - Remdesivir (3/2-->) and Decadron (3/2-->)  - Tylenol PRN, cough suppressants  - Lovenox 40mg qd    CAD  - Resume home statin, gemfibrozil, metoprolol  - Hold home enalapril for now given soft BP    Hypertension  - Resume BB and hold ACE-I    Type 2 DM with hyperglycemia  - Hold home farxiga and metformin inpatient  - ISS, FSG goal 140-180    Patient found pulseless and unresponsive by bedside nurse, Code Blue activated by nursing staff.  See Code Blue Note for further details.    Patient did not respond to verbal or noxious stimuli.  No spontaneous respirations  Absent heart and breath sounds.  Absent radial and carotid pulses  Pupils are fized and dilated, no corenal reflex.  Patient pronounced dead at 0750.  Dr. Gustafson made aware.  Family (sonTerese, at [phone]) made aware.

## 2021-06-30 NOTE — DISCHARGE NOTE FOR THE EXPIRED PATIENT - NS PATIENT DEATH CRITERIA
Patient is dead based on Cardiopulmonary criteria including absent breath sounds, pulselessness and/or asystole
DC instructions

## 2021-12-06 NOTE — H&P ADULT - NSRESEARCHGRANT_MLMHIDDEN_GEN_A_CORE
LMOM- ok to reschedule patient's missed appt today for a telephone visit     Missed appt letter mailed   yes

## 2024-02-14 NOTE — ED ADULT NURSE NOTE - NS PRO PASSIVE SMOKE EXP
Initial improvement following last exacerbation in December, now with worsening symptoms.  Will hold off on antibiotics for now but will prescribe tapered prednisone.  Continue Duonebs q 4hr PRN.  Patient to call with any worsening or persisting symptoms.  Consider patient assistance for LABA-LAMA if symptoms persist.   No

## 2024-03-30 NOTE — H&P ADULT - RS GEN PE MLT RESP DETAILS PC
David
clear to auscultation bilaterally/respirations non-labored/no chest wall tenderness/airway patent/good air movement/breath sounds equal

## 2024-09-09 NOTE — ED PROVIDER NOTE - NSCAREINITIATED _GEN_ER
Called patient to let him know that per Dr Powers his bone survey looks within normal limits. Patient happy to hear results. Patient to keep follow up appt to go over the plan of care going forward.   Jennifer Alonso(Resident)